# Patient Record
Sex: FEMALE | Race: WHITE | NOT HISPANIC OR LATINO | Employment: OTHER | URBAN - METROPOLITAN AREA
[De-identification: names, ages, dates, MRNs, and addresses within clinical notes are randomized per-mention and may not be internally consistent; named-entity substitution may affect disease eponyms.]

---

## 2017-01-04 ENCOUNTER — HOSPITAL ENCOUNTER (OUTPATIENT)
Facility: HOSPITAL | Age: 82
Discharge: HOSPICE | End: 2017-01-10
Attending: EMERGENCY MEDICINE | Admitting: HOSPITALIST
Payer: MEDICARE

## 2017-01-04 ENCOUNTER — HOSPITAL ENCOUNTER (EMERGENCY)
Facility: HOSPITAL | Age: 82
Discharge: OTHER INSTITUTION | End: 2017-01-04
Attending: EMERGENCY MEDICINE
Payer: MEDICARE

## 2017-01-04 VITALS
SYSTOLIC BLOOD PRESSURE: 174 MMHG | HEART RATE: 88 BPM | RESPIRATION RATE: 18 BRPM | DIASTOLIC BLOOD PRESSURE: 88 MMHG | BODY MASS INDEX: 16.48 KG/M2 | TEMPERATURE: 98 F | OXYGEN SATURATION: 95 % | WEIGHT: 96 LBS

## 2017-01-04 DIAGNOSIS — S32.000A LUMBAR COMPRESSION FRACTURE, CLOSED, INITIAL ENCOUNTER: Primary | ICD-10-CM

## 2017-01-04 DIAGNOSIS — E55.9 VITAMIN D DEFICIENCY: ICD-10-CM

## 2017-01-04 DIAGNOSIS — S32.000G: Primary | ICD-10-CM

## 2017-01-04 DIAGNOSIS — F03.90 DEMENTIA WITHOUT BEHAVIORAL DISTURBANCE, UNSPECIFIED DEMENTIA TYPE: ICD-10-CM

## 2017-01-04 DIAGNOSIS — S32.000A: ICD-10-CM

## 2017-01-04 DIAGNOSIS — Z95.0 CARDIAC PACEMAKER: ICD-10-CM

## 2017-01-04 LAB
ALBUMIN SERPL BCP-MCNC: 3.2 G/DL
ALP SERPL-CCNC: 77 U/L
ALT SERPL W/O P-5'-P-CCNC: 17 U/L
ANION GAP SERPL CALC-SCNC: 10 MMOL/L
AST SERPL-CCNC: 22 U/L
BACTERIA #/AREA URNS HPF: ABNORMAL /HPF
BASOPHILS # BLD AUTO: 0 K/UL
BASOPHILS NFR BLD: 0.1 %
BILIRUB SERPL-MCNC: 1.6 MG/DL
BILIRUB UR QL STRIP: NEGATIVE
BNP SERPL-MCNC: 788 PG/ML
BUN SERPL-MCNC: 34 MG/DL
CALCIUM SERPL-MCNC: 8.6 MG/DL
CHLORIDE SERPL-SCNC: 103 MMOL/L
CLARITY UR: CLEAR
CO2 SERPL-SCNC: 30 MMOL/L
COLOR UR: YELLOW
CREAT SERPL-MCNC: 1 MG/DL
DIFFERENTIAL METHOD: ABNORMAL
EOSINOPHIL # BLD AUTO: 0 K/UL
EOSINOPHIL NFR BLD: 0.3 %
ERYTHROCYTE [DISTWIDTH] IN BLOOD BY AUTOMATED COUNT: 15.8 %
EST. GFR  (AFRICAN AMERICAN): 55 ML/MIN/1.73 M^2
EST. GFR  (NON AFRICAN AMERICAN): 48 ML/MIN/1.73 M^2
GLUCOSE SERPL-MCNC: 107 MG/DL
GLUCOSE UR QL STRIP: NEGATIVE
HCT VFR BLD AUTO: 41.5 %
HGB BLD-MCNC: 13.1 G/DL
HGB UR QL STRIP: ABNORMAL
HYALINE CASTS #/AREA URNS LPF: 6 /LPF
KETONES UR QL STRIP: NEGATIVE
LEUKOCYTE ESTERASE UR QL STRIP: ABNORMAL
LYMPHOCYTES # BLD AUTO: 1.2 K/UL
LYMPHOCYTES NFR BLD: 10.9 %
MCH RBC QN AUTO: 26.8 PG
MCHC RBC AUTO-ENTMCNC: 31.5 %
MCV RBC AUTO: 85 FL
MICROSCOPIC COMMENT: ABNORMAL
MONOCYTES # BLD AUTO: 1.1 K/UL
MONOCYTES NFR BLD: 9.9 %
NEUTROPHILS # BLD AUTO: 8.7 K/UL
NEUTROPHILS NFR BLD: 78.8 %
NITRITE UR QL STRIP: NEGATIVE
PH UR STRIP: 6 [PH] (ref 5–8)
PLATELET # BLD AUTO: 193 K/UL
PMV BLD AUTO: 7.6 FL
POTASSIUM SERPL-SCNC: 3.9 MMOL/L
PROT SERPL-MCNC: 6.7 G/DL
PROT UR QL STRIP: ABNORMAL
RBC # BLD AUTO: 4.88 M/UL
RBC #/AREA URNS HPF: 4 /HPF (ref 0–4)
SODIUM SERPL-SCNC: 143 MMOL/L
SP GR UR STRIP: 1.02 (ref 1–1.03)
SQUAMOUS #/AREA URNS HPF: 3 /HPF
URN SPEC COLLECT METH UR: ABNORMAL
UROBILINOGEN UR STRIP-ACNC: NEGATIVE EU/DL
WBC # BLD AUTO: 11.1 K/UL
WBC #/AREA URNS HPF: 3 /HPF (ref 0–5)

## 2017-01-04 PROCEDURE — 96374 THER/PROPH/DIAG INJ IV PUSH: CPT

## 2017-01-04 PROCEDURE — 99285 EMERGENCY DEPT VISIT HI MDM: CPT | Mod: 25

## 2017-01-04 PROCEDURE — 83880 ASSAY OF NATRIURETIC PEPTIDE: CPT

## 2017-01-04 PROCEDURE — 25000003 PHARM REV CODE 250: Performed by: PHYSICIAN ASSISTANT

## 2017-01-04 PROCEDURE — 99204 OFFICE O/P NEW MOD 45 MIN: CPT | Mod: GC,,, | Performed by: NEUROLOGICAL SURGERY

## 2017-01-04 PROCEDURE — 99285 EMERGENCY DEPT VISIT HI MDM: CPT | Mod: ,,, | Performed by: EMERGENCY MEDICINE

## 2017-01-04 PROCEDURE — 25000003 PHARM REV CODE 250: Performed by: EMERGENCY MEDICINE

## 2017-01-04 PROCEDURE — G0378 HOSPITAL OBSERVATION PER HR: HCPCS

## 2017-01-04 PROCEDURE — 81000 URINALYSIS NONAUTO W/SCOPE: CPT

## 2017-01-04 PROCEDURE — 25000003 PHARM REV CODE 250: Performed by: STUDENT IN AN ORGANIZED HEALTH CARE EDUCATION/TRAINING PROGRAM

## 2017-01-04 PROCEDURE — 85025 COMPLETE CBC W/AUTO DIFF WBC: CPT

## 2017-01-04 PROCEDURE — 36415 COLL VENOUS BLD VENIPUNCTURE: CPT

## 2017-01-04 PROCEDURE — 99219 PR INITIAL OBSERVATION CARE,LEVL II: CPT | Mod: ,,, | Performed by: PHYSICIAN ASSISTANT

## 2017-01-04 PROCEDURE — 99284 EMERGENCY DEPT VISIT MOD MDM: CPT | Mod: 25,27

## 2017-01-04 PROCEDURE — 63600175 PHARM REV CODE 636 W HCPCS: Performed by: EMERGENCY MEDICINE

## 2017-01-04 PROCEDURE — 80053 COMPREHEN METABOLIC PANEL: CPT

## 2017-01-04 RX ORDER — HYDROMORPHONE HYDROCHLORIDE 1 MG/ML
0.5 INJECTION, SOLUTION INTRAMUSCULAR; INTRAVENOUS; SUBCUTANEOUS EVERY 4 HOURS PRN
Status: DISCONTINUED | OUTPATIENT
Start: 2017-01-04 | End: 2017-01-05

## 2017-01-04 RX ORDER — OXYCODONE HYDROCHLORIDE 5 MG/1
5 TABLET ORAL EVERY 4 HOURS PRN
Status: DISCONTINUED | OUTPATIENT
Start: 2017-01-04 | End: 2017-01-07

## 2017-01-04 RX ORDER — ALPRAZOLAM 0.25 MG/1
0.25 TABLET ORAL 2 TIMES DAILY PRN
Status: DISCONTINUED | OUTPATIENT
Start: 2017-01-04 | End: 2017-01-10 | Stop reason: HOSPADM

## 2017-01-04 RX ORDER — ASPIRIN 81 MG/1
81 TABLET ORAL DAILY
Status: DISCONTINUED | OUTPATIENT
Start: 2017-01-05 | End: 2017-01-10 | Stop reason: HOSPADM

## 2017-01-04 RX ORDER — ACETAMINOPHEN 325 MG/1
650 TABLET ORAL EVERY 6 HOURS PRN
Status: DISCONTINUED | OUTPATIENT
Start: 2017-01-04 | End: 2017-01-08

## 2017-01-04 RX ORDER — IBUPROFEN 200 MG
16 TABLET ORAL
Status: DISCONTINUED | OUTPATIENT
Start: 2017-01-05 | End: 2017-01-10 | Stop reason: HOSPADM

## 2017-01-04 RX ORDER — FUROSEMIDE 40 MG/1
40 TABLET ORAL DAILY
Status: DISCONTINUED | OUTPATIENT
Start: 2017-01-05 | End: 2017-01-10 | Stop reason: HOSPADM

## 2017-01-04 RX ORDER — IPRATROPIUM BROMIDE AND ALBUTEROL SULFATE 2.5; .5 MG/3ML; MG/3ML
3 SOLUTION RESPIRATORY (INHALATION) EVERY 4 HOURS PRN
Status: DISCONTINUED | OUTPATIENT
Start: 2017-01-05 | End: 2017-01-10 | Stop reason: HOSPADM

## 2017-01-04 RX ORDER — GLUCAGON 1 MG
1 KIT INJECTION
Status: DISCONTINUED | OUTPATIENT
Start: 2017-01-05 | End: 2017-01-10 | Stop reason: HOSPADM

## 2017-01-04 RX ORDER — POLYETHYLENE GLYCOL 3350 17 G/17G
17 POWDER, FOR SOLUTION ORAL 3 TIMES DAILY PRN
Status: DISCONTINUED | OUTPATIENT
Start: 2017-01-05 | End: 2017-01-10 | Stop reason: HOSPADM

## 2017-01-04 RX ORDER — ONDANSETRON 2 MG/ML
4 INJECTION INTRAMUSCULAR; INTRAVENOUS EVERY 8 HOURS PRN
Status: DISCONTINUED | OUTPATIENT
Start: 2017-01-05 | End: 2017-01-10 | Stop reason: HOSPADM

## 2017-01-04 RX ORDER — PANTOPRAZOLE SODIUM 40 MG/1
40 TABLET, DELAYED RELEASE ORAL DAILY
Status: DISCONTINUED | OUTPATIENT
Start: 2017-01-05 | End: 2017-01-10 | Stop reason: HOSPADM

## 2017-01-04 RX ORDER — RAMELTEON 8 MG/1
8 TABLET ORAL NIGHTLY PRN
Status: DISCONTINUED | OUTPATIENT
Start: 2017-01-04 | End: 2017-01-05

## 2017-01-04 RX ORDER — CETIRIZINE HYDROCHLORIDE 10 MG/1
10 TABLET ORAL DAILY
Status: DISCONTINUED | OUTPATIENT
Start: 2017-01-05 | End: 2017-01-05

## 2017-01-04 RX ORDER — MORPHINE SULFATE 2 MG/ML
2 INJECTION, SOLUTION INTRAMUSCULAR; INTRAVENOUS
Status: COMPLETED | OUTPATIENT
Start: 2017-01-04 | End: 2017-01-04

## 2017-01-04 RX ORDER — IBUPROFEN 200 MG
24 TABLET ORAL
Status: DISCONTINUED | OUTPATIENT
Start: 2017-01-05 | End: 2017-01-10 | Stop reason: HOSPADM

## 2017-01-04 RX ORDER — MIRTAZAPINE 30 MG/1
30 TABLET, FILM COATED ORAL NIGHTLY
Status: DISCONTINUED | OUTPATIENT
Start: 2017-01-04 | End: 2017-01-10 | Stop reason: HOSPADM

## 2017-01-04 RX ORDER — ALPRAZOLAM 0.25 MG/1
0.25 TABLET ORAL
Status: COMPLETED | OUTPATIENT
Start: 2017-01-04 | End: 2017-01-04

## 2017-01-04 RX ORDER — AMOXICILLIN 250 MG
1 CAPSULE ORAL 2 TIMES DAILY
Status: DISCONTINUED | OUTPATIENT
Start: 2017-01-05 | End: 2017-01-10 | Stop reason: HOSPADM

## 2017-01-04 RX ORDER — HYDROMORPHONE HYDROCHLORIDE 1 MG/ML
0.5 INJECTION, SOLUTION INTRAMUSCULAR; INTRAVENOUS; SUBCUTANEOUS
Status: COMPLETED | OUTPATIENT
Start: 2017-01-04 | End: 2017-01-04

## 2017-01-04 RX ORDER — ONDANSETRON 8 MG/1
8 TABLET, ORALLY DISINTEGRATING ORAL EVERY 8 HOURS PRN
Status: DISCONTINUED | OUTPATIENT
Start: 2017-01-05 | End: 2017-01-10 | Stop reason: HOSPADM

## 2017-01-04 RX ORDER — LOPERAMIDE HYDROCHLORIDE 2 MG/1
2 CAPSULE ORAL
Status: DISCONTINUED | OUTPATIENT
Start: 2017-01-05 | End: 2017-01-10 | Stop reason: HOSPADM

## 2017-01-04 RX ORDER — BUSPIRONE HYDROCHLORIDE 5 MG/1
5 TABLET ORAL 2 TIMES DAILY
Status: DISCONTINUED | OUTPATIENT
Start: 2017-01-04 | End: 2017-01-07

## 2017-01-04 RX ORDER — RAMELTEON 8 MG/1
8 TABLET ORAL NIGHTLY PRN
Status: DISCONTINUED | OUTPATIENT
Start: 2017-01-05 | End: 2017-01-10 | Stop reason: HOSPADM

## 2017-01-04 RX ADMIN — BUSPIRONE HYDROCHLORIDE 5 MG: 5 TABLET ORAL at 11:01

## 2017-01-04 RX ADMIN — OXYCODONE HYDROCHLORIDE 5 MG: 5 TABLET ORAL at 11:01

## 2017-01-04 RX ADMIN — ALPRAZOLAM 0.25 MG: 0.25 TABLET ORAL at 03:01

## 2017-01-04 RX ADMIN — HYDROMORPHONE HYDROCHLORIDE 0.5 MG: 1 INJECTION, SOLUTION INTRAMUSCULAR; INTRAVENOUS; SUBCUTANEOUS at 05:01

## 2017-01-04 RX ADMIN — MIRTAZAPINE 30 MG: 30 TABLET, FILM COATED ORAL at 11:01

## 2017-01-04 RX ADMIN — MORPHINE SULFATE 2 MG: 2 INJECTION, SOLUTION INTRAMUSCULAR; INTRAVENOUS at 01:01

## 2017-01-04 NOTE — IP AVS SNAPSHOT
[facility]  [address]  [facility] [address]  Phone: [phone]           Patient Discharge Instructions     Our goal is to set you up for success. This packet includes information on your condition, medications, and your home care. It will help you to care for yourself so you don't get sicker and need to go back to the hospital.     Please ask your nurse if you have any questions.        There are many details to remember when preparing to leave the hospital. Here is what you will need to do:    1. Take your medicine. If you are prescribed medications, review your Medication List in the following pages. You may have new medications to  at the pharmacy and others that you'll need to stop taking. Review the instructions for how and when to take your medications. Talk with your doctor or nurses if you are unsure of what to do.     2. Go to your follow-up appointments. Specific follow-up information is listed in the following pages. Your may be contacted by a transition nurse or clinical provider about future appointments. Be sure we have all of the phone numbers to reach you, if needed. Please contact your provider's office if you are unable to make an appointment.     3. Watch for warning signs. Your doctor or nurse will give you detailed warning signs to watch for and when to call for assistance. These instructions may also include educational information about your condition. If you experience any of warning signs to your health, call your doctor.               [facility]  Unless otherwise directed by your provider, please contact [facility], our nurse care line that is available for 24/7 assistance.     [phone] (toll-free)    Registered nurses in the [facility] provide clinical advisement, health education, appointment booking, and other advisory services.                    ** Verify the list of medication(s) below is accurate and up  to date. Carry this with you in case of emergency. If your medications have changed, please notify your healthcare provider.             Medication List      START taking these medications        Additional Info                      hydrocodone-acetaminophen 5-325mg 5-325 mg per tablet   Commonly known as:  NORCO   Quantity:  90 tablet   Refills:  0   Dose:  1 tablet    Last time this was given:  1 tablet on 1/10/2017  9:40 AM   Instructions:  Take 1 tablet by mouth every 6 (six) hours as needed for Pain.     Begin Date    AM    Noon    PM    Bedtime       naproxen 250 MG tablet   Commonly known as:  NAPROSYN   Refills:  0   Dose:  250 mg    Last time this was given:  250 mg on 1/9/2017  6:40 PM   Instructions:  Take 1 tablet (250 mg total) by mouth 2 (two) times daily as needed (pain).     Begin Date    AM    Noon    PM    Bedtime       quetiapine 25 MG Tab   Commonly known as:  SEROQUEL   Refills:  0   Dose:  25 mg    Last time this was given:  25 mg on 1/10/2017  9:00 AM   Instructions:  Take 1 tablet (25 mg total) by mouth 2 (two) times daily.     Begin Date    AM    Noon    PM    Bedtime         CHANGE how you take these medications        Additional Info                      omeprazole 20 MG capsule   Commonly known as:  PRILOSEC   Quantity:  180 capsule   Refills:  3   Dose:  20 mg   What changed:  when to take this    Instructions:  Take 1 capsule (20 mg total) by mouth once daily.     Begin Date    AM    Noon    PM    Bedtime       temazepam 15 mg Cap   Commonly known as:  RESTORIL   Quantity:  90 capsule   Refills:  0   Dose:  15 mg   What changed:  reasons to take this    Instructions:  Take 1 capsule (15 mg total) by mouth nightly as needed (insomnia).     Begin Date    AM    Noon    PM    Bedtime         CONTINUE taking these medications        Additional Info                      acetaminophen 325 MG tablet   Commonly known as:  TYLENOL   Quantity:  30 tablet   Refills:  0   Dose:  650 mg     Instructions:  Take 2 tablets (650 mg total) by mouth every 4 (four) hours as needed.     Begin Date    AM    Noon    PM    Bedtime       alprazolam 0.25 MG tablet   Commonly known as:  XANAX   Quantity:  60 tablet   Refills:  5   Dose:  0.25 mg    Last time this was given:  0.25 mg on 1/10/2017  9:40 AM   Instructions:  Take 1 tablet (0.25 mg total) by mouth 2 (two) times daily as needed for Anxiety.     Begin Date    AM    Noon    PM    Bedtime       aspirin 81 MG EC tablet   Commonly known as:  ECOTRIN   Refills:  0   Dose:  81 mg    Last time this was given:  81 mg on 1/10/2017  9:00 AM   Instructions:  Take 81 mg by mouth once daily.     Begin Date    AM    Noon    PM    Bedtime       diclofenac sodium 1 % Gel   Commonly known as:  VOLTAREN   Quantity:  100 g   Refills:  6   Dose:  2 g    Instructions:  Apply 2 g topically 4 (four) times daily as needed.     Begin Date    AM    Noon    PM    Bedtime       furosemide 20 MG tablet   Commonly known as:  LASIX   Quantity:  180 tablet   Refills:  3   Dose:  40 mg    Last time this was given:  40 mg on 1/10/2017  9:00 AM   Instructions:  Take 2 tablets (40 mg total) by mouth once daily.     Begin Date    AM    Noon    PM    Bedtime       lidocaine 5 %   Commonly known as:  LIDODERM   Quantity:  30 patch   Refills:  6   Dose:  1 patch    Last time this was given:  1 patch on 1/10/2017  2:06 PM   Instructions:  Place 1 patch onto the skin once daily. Remove & Discard patch within 12 hours or as directed by MD     Begin Date    AM    Noon    PM    Bedtime       loratadine 10 mg tablet   Commonly known as:  CLARITIN   Refills:  0   Dose:  10 mg    Instructions:  Take 10 mg by mouth daily as needed for Allergies.     Begin Date    AM    Noon    PM    Bedtime       mirtazapine 30 MG tablet   Commonly known as:  REMERON   Quantity:  90 tablet   Refills:  3   Dose:  30 mg    Last time this was given:  30 mg on 1/9/2017  8:46 PM   Instructions:  Take 1 tablet (30 mg total) by  mouth every evening.     Begin Date    AM    Noon    PM    Bedtime       ondansetron 4 MG tablet   Commonly known as:  ZOFRAN   Quantity:  30 tablet   Refills:  6   Dose:  4 mg    Instructions:  Take 1 tablet (4 mg total) by mouth every 8 (eight) hours as needed for Nausea.     Begin Date    AM    Noon    PM    Bedtime       PROLIA 60 mg/mL Syrg   Refills:  0   Dose:  60 mg   Generic drug:  denosumab    Instructions:  Inject 60 mg into the skin every 6 (six) months.     Begin Date    AM    Noon    PM    Bedtime       white petrolatum-mineral oil 56.8-42.5% 56.8-42.5 % Oint   Commonly known as:  LACRI-LUBE S.O.P.   Quantity:  7 g   Refills:  11    Instructions:  Place into both eyes every evening.     Begin Date    AM    Noon    PM    Bedtime         STOP taking these medications     busPIRone 5 MG Tab   Commonly known as:  BUSPAR       ciclopirox 8 % Soln   Commonly known as:  PENLAC            Where to Get Your Medications      You can get these medications from any pharmacy     Bring a paper prescription for each of these medications     hydrocodone-acetaminophen 5-325mg 5-325 mg per tablet    temazepam 15 mg Cap         Information about where to get these medications is not yet available     ! Ask your nurse or doctor about these medications     naproxen 250 MG tablet    omeprazole 20 MG capsule    quetiapine 25 MG Tab                  Please bring to all follow up appointments:    1. A copy of your discharge instructions.  2. All medicines you are currently taking in their original bottles.  3. Identification and insurance card.    Please arrive 15 minutes ahead of scheduled appointment time.    Please call 24 hours in advance if you must reschedule your appointment and/or time.        Your Scheduled Appointments     Feb 06, 2017  2:00 PM CST   Established Patient Visit with EBONY Hoover - Podiatry ([place])    [address] [place] [id number] [phone]            Feb 10, 2017  10:00 AM CST   Diagnostic Xray with [facility] [id number] 485 LB LIMIT   [facility] [facility]    [address]  [facility] [id number] [phone]            Feb 10, 2017 11:00 AM CST   Established Patient Visit with MARGO Perdomo Emperat[facility] - Neurosurgery 7th Fl ([place] )    [address]  [facility] [id number] [phone]            Apr 25, 2017  8:00 AM CDT   Non-Fasting Lab with [facility] ([facility])    [address]  [facility] [id number] [phone]            Apr 26, 2017 10:00 AM CDT   Established Patient Visit with Etta Shane MD   [place] - Hematology Oncology ([facility] - [facility])    [address]  [facility] [id number] [phone]              Follow-up Information     Follow up with Elvira Em MD In 2 weeks.    Specialty:  Family Medicine    Contact information:    [address]  [facility] [address]  [phone]          Follow up with Connor Shaw PA-C On 2/10/2017.    Specialty:  Neurosurgery    Why:  Please keep this follow up appointment.     Contact information:    [address]  [facility] [address]  [phone]          Follow up with [facility] [place] [facility] [place].    Specialties:  Nursing Home Agency, SNF Agency    Why:  Nursing Home with Hospice Consult     Contact information:    [address] [facility] [address]  [phone]          Discharge Instructions     Future Orders    X-Ray Lumbar Spine AP And Lateral     Questions:    Does the patient have a neck collar or brace on?:      Reason for Exam:  outpatient follow up L2 fx. in brace upright and supine.    May the Radiologist modify the order per protocol to meet the clinical needs of the patient?:  Yes    Activity as tolerated     Call MD for:  severe uncontrolled pain     Diet general     Questions:    Total calories:      Fat restriction, if any:      Protein restriction, if  "any:      Na restriction, if any:      Fluid restriction:      Additional restrictions:          Primary Diagnosis     Your primary diagnosis was:  Compression Fracture Of Lumbar Vertebra      Admission Information     Date & Time Provider Department CSN    1/4/2017  5:23 PM Jaspreet Estrada MD [facility] [id number]      Care Providers     Provider Role Specialty Primary office phone    Jaspreet Estrada MD Attending Provider Hospitalist [phone]    Reyes Dee MD Team Attending  Hospitalist [phone]    Jaspreet Estrada MD Team Attending  Hospitalist [phone]      Your Vitals Were     BP Pulse Temp Resp Height Weight    125/66 (BP Location: Left arm, Patient Position: Lying, BP Method: Automatic) 73 98.1 °F (36.7 °C) (Axillary) 16 5' 6" (1.676 m) 49.9 kg (110 lb)    SpO2 BMI             92% 17.75 kg/m2         Recent Lab Values        3/13/2015                           5:13 PM           A1C 6.6 (H)                       Allergies as of 1/10/2017        Reactions    Celexa [Citalopram]     Phenergan [Promethazine]     Pt states not allergic to phenergan    Bactrim [Sulfamethoxazole-trimethoprim] Rash      Advance Directives     An advance directive is a document which, in the event you are no longer able to make decisions for yourself, tells your healthcare team what kind of treatment you do or do not want to receive, or who you would like to make those decisions for you.  If you do not currently have an advance directive, [facility] encourages you to create one.  For more information call:  [phone]), [phone] ([phone]),  or log on to [email or web address].        Language Assistance Services     ATTENTION: Language assistance services are available, free of charge. Please call [phone].      ATENCIÓN: Si habla español, tiene a brian disposición servicios gratuitos de asistencia lingüística. Llame al [phone].     CHÚ Ý: N?u b?n nói Ti?ng Vi?t, có các " d?ch v? h? tr? ngôn ng? mi?n phí dành cho b?n. G?i s? [phone].         [facility] complies with applicable Federal civil rights laws and does not discriminate on the basis of race, color, national origin, age, disability, or sex.

## 2017-01-04 NOTE — ED NOTES
Report received from Aashish Vigil RN stable.Call light in reach. Bed in lowest position,  rails up for safety. Pt alert and oriented times 3.Daughter has multiple  Co concerns.

## 2017-01-04 NOTE — IP AVS SNAPSHOT
[address]  [place] [address]  Phone: [phone]           I have received a copy of my After Visit Summary and discharge instructions from [facility].    INSTRUCTIONS RECEIVED AND UNDERSTOOD BY:                     Patient/Patient Representative: ________________________________________________________________     Date/Time: ________________________________________________________________                     Instructions Given By: ________________________________________________________________     Date/Time: ________________________________________________________________
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
## 2017-01-05 LAB
ANION GAP SERPL CALC-SCNC: 8 MMOL/L
BASOPHILS # BLD AUTO: 0.01 K/UL
BASOPHILS NFR BLD: 0.1 %
BUN SERPL-MCNC: 36 MG/DL
CALCIUM SERPL-MCNC: 8.2 MG/DL
CHLORIDE SERPL-SCNC: 106 MMOL/L
CO2 SERPL-SCNC: 30 MMOL/L
CREAT SERPL-MCNC: 1.1 MG/DL
DIFFERENTIAL METHOD: ABNORMAL
EOSINOPHIL # BLD AUTO: 0 K/UL
EOSINOPHIL NFR BLD: 0.1 %
ERYTHROCYTE [DISTWIDTH] IN BLOOD BY AUTOMATED COUNT: 15.9 %
EST. GFR  (AFRICAN AMERICAN): 49 ML/MIN/1.73 M^2
EST. GFR  (NON AFRICAN AMERICAN): 42.5 ML/MIN/1.73 M^2
GLUCOSE SERPL-MCNC: 97 MG/DL
HCT VFR BLD AUTO: 39.1 %
HGB BLD-MCNC: 11.8 G/DL
LYMPHOCYTES # BLD AUTO: 1.4 K/UL
LYMPHOCYTES NFR BLD: 16.5 %
MAGNESIUM SERPL-MCNC: 2.6 MG/DL
MCH RBC QN AUTO: 27 PG
MCHC RBC AUTO-ENTMCNC: 30.2 %
MCV RBC AUTO: 90 FL
MONOCYTES # BLD AUTO: 1.1 K/UL
MONOCYTES NFR BLD: 13.2 %
NEUTROPHILS # BLD AUTO: 6.1 K/UL
NEUTROPHILS NFR BLD: 69.9 %
PHOSPHATE SERPL-MCNC: 3.3 MG/DL
PLATELET # BLD AUTO: 160 K/UL
PMV BLD AUTO: 9.5 FL
POTASSIUM SERPL-SCNC: 5 MMOL/L
RBC # BLD AUTO: 4.37 M/UL
SODIUM SERPL-SCNC: 144 MMOL/L
WBC # BLD AUTO: 8.66 K/UL

## 2017-01-05 PROCEDURE — 97535 SELF CARE MNGMENT TRAINING: CPT

## 2017-01-05 PROCEDURE — G8979 MOBILITY GOAL STATUS: HCPCS | Mod: CK

## 2017-01-05 PROCEDURE — 83735 ASSAY OF MAGNESIUM: CPT

## 2017-01-05 PROCEDURE — G8978 MOBILITY CURRENT STATUS: HCPCS | Mod: CL

## 2017-01-05 PROCEDURE — G8997 SWALLOW GOAL STATUS: HCPCS | Mod: CL

## 2017-01-05 PROCEDURE — 25000003 PHARM REV CODE 250: Performed by: PHYSICIAN ASSISTANT

## 2017-01-05 PROCEDURE — 97161 PT EVAL LOW COMPLEX 20 MIN: CPT

## 2017-01-05 PROCEDURE — 85025 COMPLETE CBC W/AUTO DIFF WBC: CPT

## 2017-01-05 PROCEDURE — 97530 THERAPEUTIC ACTIVITIES: CPT

## 2017-01-05 PROCEDURE — 36415 COLL VENOUS BLD VENIPUNCTURE: CPT

## 2017-01-05 PROCEDURE — G0378 HOSPITAL OBSERVATION PER HR: HCPCS

## 2017-01-05 PROCEDURE — G8996 SWALLOW CURRENT STATUS: HCPCS | Mod: CM

## 2017-01-05 PROCEDURE — 99225 PR SUBSEQUENT OBSERVATION CARE,LEVEL II: CPT | Mod: ,,, | Performed by: PHYSICIAN ASSISTANT

## 2017-01-05 PROCEDURE — 80048 BASIC METABOLIC PNL TOTAL CA: CPT

## 2017-01-05 PROCEDURE — 92610 EVALUATE SWALLOWING FUNCTION: CPT

## 2017-01-05 PROCEDURE — 99214 OFFICE O/P EST MOD 30 MIN: CPT | Mod: ,,, | Performed by: PHYSICIAN ASSISTANT

## 2017-01-05 PROCEDURE — 84100 ASSAY OF PHOSPHORUS: CPT

## 2017-01-05 RX ORDER — OXYCODONE HYDROCHLORIDE 5 MG/1
10 TABLET ORAL EVERY 6 HOURS PRN
Status: DISCONTINUED | OUTPATIENT
Start: 2017-01-05 | End: 2017-01-06

## 2017-01-05 RX ORDER — SODIUM CHLORIDE 9 MG/ML
INJECTION, SOLUTION INTRAVENOUS CONTINUOUS
Status: ACTIVE | OUTPATIENT
Start: 2017-01-05 | End: 2017-01-06

## 2017-01-05 RX ORDER — CETIRIZINE HYDROCHLORIDE 5 MG/1
5 TABLET ORAL DAILY
Status: DISCONTINUED | OUTPATIENT
Start: 2017-01-06 | End: 2017-01-07

## 2017-01-05 RX ADMIN — PANTOPRAZOLE SODIUM 40 MG: 40 TABLET, DELAYED RELEASE ORAL at 08:01

## 2017-01-05 RX ADMIN — OXYCODONE HYDROCHLORIDE 5 MG: 5 TABLET ORAL at 11:01

## 2017-01-05 RX ADMIN — CETIRIZINE HYDROCHLORIDE 10 MG: 10 TABLET, FILM COATED ORAL at 08:01

## 2017-01-05 RX ADMIN — RAMELTEON 8 MG: 8 TABLET, FILM COATED ORAL at 12:01

## 2017-01-05 RX ADMIN — SODIUM CHLORIDE: 0.9 INJECTION, SOLUTION INTRAVENOUS at 04:01

## 2017-01-05 RX ADMIN — STANDARDIZED SENNA CONCENTRATE AND DOCUSATE SODIUM 1 TABLET: 8.6; 5 TABLET, FILM COATED ORAL at 12:01

## 2017-01-05 RX ADMIN — STANDARDIZED SENNA CONCENTRATE AND DOCUSATE SODIUM 1 TABLET: 8.6; 5 TABLET, FILM COATED ORAL at 08:01

## 2017-01-05 RX ADMIN — ALPRAZOLAM 0.25 MG: 0.25 TABLET ORAL at 06:01

## 2017-01-05 RX ADMIN — BUSPIRONE HYDROCHLORIDE 5 MG: 5 TABLET ORAL at 08:01

## 2017-01-05 RX ADMIN — FUROSEMIDE 40 MG: 40 TABLET ORAL at 08:01

## 2017-01-05 RX ADMIN — HYDROMORPHONE HYDROCHLORIDE 0.5 MG: 1 INJECTION, SOLUTION INTRAMUSCULAR; INTRAVENOUS; SUBCUTANEOUS at 08:01

## 2017-01-05 RX ADMIN — OXYCODONE HYDROCHLORIDE 10 MG: 5 TABLET ORAL at 04:01

## 2017-01-05 RX ADMIN — OXYCODONE HYDROCHLORIDE 5 MG: 5 TABLET ORAL at 05:01

## 2017-01-05 RX ADMIN — ASPIRIN 81 MG: 81 TABLET, COATED ORAL at 08:01

## 2017-01-05 RX ADMIN — MIRTAZAPINE 30 MG: 30 TABLET, FILM COATED ORAL at 08:01

## 2017-01-05 RX ADMIN — ALPRAZOLAM 0.25 MG: 0.25 TABLET ORAL at 04:01

## 2017-01-05 NOTE — SUBJECTIVE & OBJECTIVE
Interval History: Patient with recurrence of lower back pain, but family reports increased confusion after IV medication administration. Family extremely concerned about patient returning home as they report she does not have good care at home and they will be unable to fully care for her. Patient reporting pain on exam this morning, has perseverative thoughts/statements, confusion of object names. Daughter reports this is her baseline dementia but pain keeping her from relaxing this morning.    Review of Systems   Unable to perform ROS: Dementia     Objective:     Vital Signs (Most Recent):  Temp: 98.1 °F (36.7 °C) (01/05/17 1200)  Pulse: 69 (01/05/17 1200)  Resp: 18 (01/05/17 1200)  BP: 123/64 (01/05/17 1200)  SpO2: 97 % (01/05/17 1200) Vital Signs (24h Range):  Temp:  [96.9 °F (36.1 °C)-98.5 °F (36.9 °C)] 98.1 °F (36.7 °C)  Pulse:  [63-89] 69  Resp:  [18] 18  BP: (104-174)/(55-88) 123/64     Weight: 49.9 kg (110 lb)  Body mass index is 17.75 kg/(m^2).  No intake or output data in the 24 hours ending 01/05/17 1441   Physical Exam   Constitutional: She is oriented to person, place, and time. She appears well-developed and well-nourished. No distress.   HENT:   Head: Normocephalic and atraumatic.   Neck: Carotid bruit is not present.   Cardiovascular: Normal rate and regular rhythm.  Exam reveals no gallop.    No murmur heard.  Pulmonary/Chest: Effort normal and breath sounds normal. No respiratory distress. She has no wheezes.   Abdominal: Soft. Bowel sounds are normal. She exhibits no distension. There is no splenomegaly or hepatomegaly. There is no tenderness.   Musculoskeletal: Normal range of motion. She exhibits tenderness (Lumbar spine). She exhibits no edema or deformity.   Neurological: She is alert and oriented to person, place, and time. No cranial nerve deficit or sensory deficit.       Significant Labs: All pertinent labs within the past 24 hours have been reviewed.    Significant Imaging: I have  reviewed all pertinent imaging results/findings within the past 24 hours.

## 2017-01-05 NOTE — NURSING
Patient has severe dementia, unable to reorient. Bed alarm and avays camera in place for safety. LSO brace will in bed at 90 deg, patient prefers to sleep sitting up.      Patient perseverates but sleeps well with pain meds. Pills need to be crushed in pudding, will pocket whole pills. Not combative, but will try to undress and mess with medical devices.      Will continue to monitor. Waiting for SW to find placement.

## 2017-01-05 NOTE — ASSESSMENT & PLAN NOTE
- Appreciate neurosurgery's assistance.  Family does not wish to pursue surgical options.  Patient is nonambulatory at baseline.  LSO brace.  - Will order sitting and supine x-rays in brace.  - Supportive care.

## 2017-01-05 NOTE — ED NOTES
LOC: The patient is awake, alert, and oriented to place, time, situation though she is hard of hearing. Affect is appropriate.  Speech is appropriate and clear.     APPEARANCE: Patient resting comfortably in no acute distress.  Patient is clean and well groomed.    SKIN: The skin is warm and dry; color consistent with ethnicity.  Patient has normal skin turgor and moist mucus membranes.  Skin intact; bruising noted to the bilateral lower extremities.     MUSCULOSKELETAL: Patient moving upper and lower extremities upon command but complains of pain in the lower back with movement.  Reports weakness.     RESPIRATORY: Airway is open and patent. Respirations spontaneous, even, easy, and non-labored.  Patient has a normal effort and rate.  No accessory muscle use noted. Denies cough.     CARDIAC:  Normal rhythm and rate noted.  +3 non-pitting peripheral edema noted. No complaints of chest pain.      ABDOMEN: Soft and non tender to palpation.  No distention noted.     NEUROLOGIC: Eyes open spontaneously.  Behavior appropriate to situation.  Follows commands; facial expression symmetrical.  Purposeful motor response noted; normal sensation in all extremities.

## 2017-01-05 NOTE — PROGRESS NOTES
[facility] Hospital Medicine  Progress Note    Patient Name: Melina Boss  MRN: [id number]  Patient Class: OP- Observation   Admission Date: 1/4/2017  Length of Stay: 0 days  Expected Discharge Date: 1/6/2017  Attending Physician: Jaspreet Estrada MD  Primary Care Provider: Elvira Em MD    [facility] Medicine Team: [facility] HOSP MED E Nohemi Pedersen PA-C    Subjective:     Principal Problem:Compression of lumbar vertebra    HPI:  Patient is a 89 year old lady with a h/o osteoarthritis, dementia, bradycardia s/p PPM, GERD, OA, and vitamin D deficiency.  She was transferred from [facility] for neurosurgical evaluation of L2 compression fracture.  Daughter at bedside provides most of history.  She reports a sudden change in patient's posture on 1/1/2017 when patient was shifted from her car to her wheelchair by her caregiver.  Since that time, she has complained of LBP.  Today, however, she was unable to perform her ADLs.  CT spine revealed L2 compression fracture with extension into the left pedicle.  She continues to have back pain, but states it is better with pain meds.  She was in her usual state of health prior to this event.  Denies loss of bowel/bladder control.  Denies chest pain, SOB, N/V, fever/chills.  Patient is very hard of hearing and is able to hear best out of L ear.  She uses a wheelchair at baseline.    Hospital Course:  89 yo female admitted to observation for further evaluation and treatment of compression of lumbar vertebra. Neurosurgery consulted, family does not wish to pursue surgical options at this time, LSO brace, repeat XR in brace (supine and sitting) pending. Continue supportive care and pain control. PT/OT consulted, recommending [facility] SNF. CM/SW assisting in placement.    Interval History: Patient with recurrence of lower back pain, but family reports increased confusion after IV medication administration. Family extremely concerned about patient returning home as they  report she does not have good care at home and they will be unable to fully care for her. Patient reporting pain on exam this morning, has perseverative thoughts/statements, confusion of object names. Daughter reports this is her baseline dementia but pain keeping her from relaxing this morning.    Review of Systems   Unable to perform ROS: Dementia     Objective:     Vital Signs (Most Recent):  Temp: 98.1 °F (36.7 °C) (01/05/17 1200)  Pulse: 69 (01/05/17 1200)  Resp: 18 (01/05/17 1200)  BP: 123/64 (01/05/17 1200)  SpO2: 97 % (01/05/17 1200) Vital Signs (24h Range):  Temp:  [96.9 °F (36.1 °C)-98.5 °F (36.9 °C)] 98.1 °F (36.7 °C)  Pulse:  [63-89] 69  Resp:  [18] 18  BP: (104-174)/(55-88) 123/64     Weight: 49.9 kg (110 lb)  Body mass index is 17.75 kg/(m^2).  No intake or output data in the 24 hours ending 01/05/17 1441   Physical Exam   Constitutional: She is oriented to person, place, and time. She appears well-developed and well-nourished. No distress.   HENT:   Head: Normocephalic and atraumatic.   Neck: Carotid bruit is not present.   Cardiovascular: Normal rate and regular rhythm.  Exam reveals no gallop.    No murmur heard.  Pulmonary/Chest: Effort normal and breath sounds normal. No respiratory distress. She has no wheezes.   Abdominal: Soft. Bowel sounds are normal. She exhibits no distension. There is no splenomegaly or hepatomegaly. There is no tenderness.   Musculoskeletal: Normal range of motion. She exhibits tenderness (Lumbar spine). She exhibits no edema or deformity.   Neurological: She is alert and oriented to person, place, and time. No cranial nerve deficit or sensory deficit.       Significant Labs: All pertinent labs within the past 24 hours have been reviewed.    Significant Imaging: I have reviewed all pertinent imaging results/findings within the past 24 hours.    Assessment/Plan:      * Compression of lumbar vertebra  - Appreciate neurosurgery's assistance.  Family does not wish to pursue  surgical options.  Patient is nonambulatory at baseline.  LSO brace.  - Sitting and supine x-rays in brace with L2 compression fracture with no change in alignment of the lumbar spine on the sitting and upright views  - Supportive care. Discontinued IV pain medication, will try to manage with oral analgesics PRN as patient with increased confusion today.   - PT/OT consults recommending [facility] SNF. CM/SW assisting with possible placement. Family reports they are unable to care for patient at home.       Vitamin D deficiency  - Vitamin D level pending      Cardiac pacemaker  - Placed secondary to bradycardia.  Stable.      Dementia  - Continue Remeron 30mg qHS, Xanax 0.25mg BID PRN.  - Daughter notes patient tends to sundown.    DELIRIUM BEHAVIOR MANAGEMENT  - Minimize use of restraints; if physical restraints necessary, please utilize medical/chemical prns for agitation.  - Keep shades open and room lit during day and room dim at night in order to promote healthy circadian rhythms.  - Encourage family at bedside  - Keep whiteboard in patient's room current with the date and name of the members of patient's team for easy patient self re-orientation.  - Avoid benzodiazepines, antihistamines, anticholinergics, hypnotics, and minimize opiates while controlling for pain as these medications may exacerbate delirium.      VTE Risk Mitigation         Ordered     Medium Risk of VTE  Once      01/04/17 2357     Place SAUL hose  Until discontinued      01/04/17 2357     Place sequential compression device  Until discontinued      01/04/17 2357          Nohemi Pedersen PA-C  Department of Hospital Medicine   [facility]  Staff: Dr. Estrada

## 2017-01-05 NOTE — PROGRESS NOTES
Entered pt's room to transfer pt to [facility] & pt's family was attempting to get pt out of bed. Educated family members on importance of therapy evaluating pt & pt's mobility status before ambulating pt and on the importance of calling for assistance 2/2 pt being a fall risk. The family had also taken NC off of pt. Educated family on the importance of pt wearing NC to ensure adequate oxygenation. After thorough education & pt's family verbalizing understanding, pt was transferred to 1064 in stable condition. Report given to RN.

## 2017-01-05 NOTE — CONSULTS
Consult Note  Neurosurgery    Consult Requested By: ED  Reason for Consult: L2 fracture    SUBJECTIVE:     History of Present Illness:  Patient is a 89 y.o. female with PMHX of dementia, osteoarthritis, CHF, cardiomyopathy, pacemaker,  recently on eliquis for hx of LE DVT presents for evaluation of L2 fracture.  Per daughter pt developed back pain after attempting to sit in her wheelchair.  Caregiver was assisting pt into wheelchair where she plopped down and hit her tailbone onto edge of wheelchair.  She developed nonradicular back pain following which has progressively gotten worse.  Pt is still able to sense the urge to void and family claims has not been incontinent since the event.  Pt is however not ambulatory at baseline.    Scheduled Meds:  Continuous Infusions:  PRN Meds:    Review of patient's allergies indicates:   Allergen Reactions    Celexa [citalopram]     Phenergan [promethazine]      Pt states not allergic to phenergan      Bactrim [sulfamethoxazole-trimethoprim] Rash       Past Medical History   Diagnosis Date    Anxiety associated with depression     Arthritis     Encounter for blood transfusion     Fracture of femoral neck, right, closed 4/30/2013    Hypertension     Nausea     Osteoarthritis     Urinary tract infection      Past Surgical History   Procedure Laterality Date    Knee surgery       right TKA    Varicose veins       venous stripping    Cardiac pacemaker placement      Total hip arthroplasty Bilateral     Blepheroplasty      Ear tuck      Deviated septum repair       Family History   Problem Relation Age of Onset    Hypertension Daughter     Stroke Other     Cancer Other      melanoma    Kidney disease Neg Hx      Social History   Substance Use Topics    Smoking status: Never Smoker    Smokeless tobacco: Never Used    Alcohol use No        Review of Systems:  See HPI    OBJECTIVE:     Vital Signs (Most Recent)  Temp: 98.5 °F (36.9 °C) (01/04/17 1705)  Pulse:  89 (01/04/17 1705)  Resp: 18 (01/04/17 1705)  BP: 123/60 (01/04/17 1806)  SpO2: 99 % (01/04/17 1807)    Vital Signs Range (Last 24H):  Temp:  [98.2 °F (36.8 °C)-98.5 °F (36.9 °C)]   Pulse:  [69-89]   Resp:  [18-20]   BP: (123-174)/(60-88)   SpO2:  [92 %-99 %]     Physical Exam:  Awake, oriented to name only  PERRL, EOMI, face grossly symmetric, tongue midline  4/5 in BUE/BLE  SILT  No babinski  No spinal tenderness to palpation    Laboratory:  CBC:   Recent Labs  Lab 01/04/17  1036   WBC 11.10   RBC 4.88   HGB 13.1   HCT 41.5      MCV 85   MCH 26.8*   MCHC 31.5*     CMP:   Recent Labs  Lab 01/04/17  1036      CALCIUM 8.6*   ALBUMIN 3.2*   PROT 6.7      K 3.9   CO2 30*      BUN 34*   CREATININE 1.0   ALKPHOS 77   ALT 17   AST 22   BILITOT 1.6*     Coagulation: No results for input(s): INR, APTT in the last 168 hours.    Invalid input(s): PT  Cardiac markers: No results for input(s): CKMB, CPKMB, TROPONINT, TROPONINI, MYOGLOBIN in the last 168 hours.    Recent Labs  Lab 01/04/17  1336   COLORU Yellow   SPECGRAV 1.020   PHUR 6.0   PROTEINUA Trace*   BACTERIA Moderate*   NITRITE Negative   LEUKOCYTESUR Trace*   UROBILINOGEN Negative   HYALINECASTS 6*         Diagnostic Results:  CT L spine: burst fracture involving superior endplate of L2 with less than 50% loss of height.  Small amount of retropulsion into spinal canal with mild canal stenosis.    ASSESSMENT/PLAN:     89 F s/p fall with L2 compression fracture.  -No acute NSGY intervention  -Family doesn't wish to pursue surgical options  -LSO brace  -Sitting and supine xrays in brace  -Admit to medicine for pain control  -Discussed with Dr. Chester.

## 2017-01-05 NOTE — PROGRESS NOTES
Progress Note  Neurosurgery    Admit Date: 1/4/2017  Post-operative Day:    Hospital Day: 2    SUBJECTIVE:     Melina Boss is a 89 y.o. female  with PMHX of dementia, osteoarthritis, CHF, cardiomyopathy, pacemaker, recently on eliquis for hx of LE DVT, now with L2 fracture. NAEON. Patient laying in bed complains of low back pain. She denies in LE pain/numbness/paresthesias or b/b incontinence. Brace at bedside. Daughter at bedside.       Follow-up For:  * No surgery found *      Scheduled Meds:   aspirin  81 mg Oral Daily    busPIRone  5 mg Oral BID    [START ON 1/6/2017] cetirizine  5 mg Oral Daily    furosemide  40 mg Oral Daily    mirtazapine  30 mg Oral QHS    pantoprazole  40 mg Oral Daily    senna-docusate 8.6-50 mg  1 tablet Oral BID     Continuous Infusions:   PRN Meds:acetaminophen, albuterol-ipratropium 2.5mg-0.5mg/3mL, alprazolam, dextrose 50%, dextrose 50%, glucagon (human recombinant), glucose, glucose, HYDROmorphone, loperamide, ondansetron, ondansetron, oxycodone, polyethylene glycol, ramelteon    Review of patient's allergies indicates:   Allergen Reactions    Celexa [citalopram]     Phenergan [promethazine]      Pt states not allergic to phenergan      Bactrim [sulfamethoxazole-trimethoprim] Rash       OBJECTIVE:     Vital Signs (Most Recent)  Temp: 98.1 °F (36.7 °C) (01/05/17 1200)  Pulse: 69 (01/05/17 1200)  Resp: 18 (01/05/17 1200)  BP: 123/64 (01/05/17 1200)  SpO2: 97 % (01/05/17 1200)    Vital Signs Range (Last 24H):  Temp:  [96.9 °F (36.1 °C)-98.5 °F (36.9 °C)]   Pulse:  [63-89]   Resp:  [18]   BP: (104-174)/(55-88)   SpO2:  [95 %-100 %]     I & O (Last 24H):No intake or output data in the 24 hours ending 01/05/17 1100  Physical Exam:  General: well developed, well nourished. no acute distress.  Neurologic: Awake, alert and oriented x3. Thought content appropriate.  Head: normocephalic, atraumatic   GCS: Motor: 6/Verbal: 5/Eyes: 4 GCS Total: 15  Cranial nerves: face symmetric,  tongue midline, pupils equal, round, reactive to light with accomodation, extraocular muscles intact. CN II-XII grossly intact.   Language: no aphasia  Speech: no dysarthria   Sensory: response to light touch throughout  Motor Strength: Moves all extremities spontaneously with good tone. 4+/5 throughout. Generalized deconditioning.   Barrett: absent  Clonus: absent  Babinski: absent   Gait: did not assess.   ENT: normal hearing with finger rub  Heart: RRR, no cyanosis, pallor, or edema.   Lungs:  normal respiratory effort  Abdomen: soft, non-tender and symmetric  Extremities: warm with no cyanosis, edema, or clubbing  Pulses: palpable distal pulses  Skin: warm, dry and intact. No visible rashes or lesions.       Lines/Drains:       Peripheral IV - Single Lumen 01/04/17 1040 Left Upper Arm (Active)   Site Assessment Clean;Dry;Intact 1/4/2017 10:30 PM   Line Status Flushed 1/4/2017 10:30 PM   Dressing Status Clean;Dry;Intact 1/4/2017 10:30 PM   Dressing Change Due 01/08/17 1/4/2017 10:30 PM   Site Change Due 01/08/17 1/4/2017 10:30 PM   Reason Not Rotated Not due 1/4/2017 10:30 PM   Number of days:1       Laboratory:  CBC:   Recent Labs  Lab 01/05/17  0351   WBC 8.66   RBC 4.37   HGB 11.8*   HCT 39.1      MCV 90   MCH 27.0   MCHC 30.2*     BMP:   Recent Labs  Lab 01/05/17  0351   GLU 97      K 5.0      CO2 30*   BUN 36*   CREATININE 1.1   CALCIUM 8.2*   MG 2.6       Diagnostic Results:  Sitting and supine Lumbar xrays: personally reviewed and agree with findings  Burst fracture at L2     ASSESSMENT/PLAN:     89 F s/p fall with L2 compression fracture.    -Patient neurostable on exam.  -No acute neurosurigcal intervention  -Pain management per primary team.   -Family doesn't wish to pursue surgical options  -LSO brace when OOB or upright.   -Sitting and supine xrays in brace reviewed with Dr. Chester  -Signing off. Will set up 8 wk NSR clinic follow up with xrays prior.     Discussed with   Henrik.    Please call with any questions.    Connor Shaw PA-C  Neurosurgery  Pager: [phone]

## 2017-01-05 NOTE — PLAN OF CARE
2:00pm: SW met with Pt and family at bedside. Discussed differences between SNF and regular placement. They reported having a facility they were going to place her but she had a decline in health and ended up here. They also had hospice set up there. Discussed getting her a list for local SNF but reported SW would confer with CM and therapy for recommendation first.     3:30pm: SW spoke with CM who reported the plan would stay the same for the Pt to go to the previous facility.    4:10 PM: Contacted [facility] ([phone]) to speak with Gabrielle regarding placement tomorrow. Also about Hospice through [facility] in [place]. She reported they will take Pt in the morning. SW will fax the requested paperwork to [phone].    Sania Patterson LMSW  PRN   [facility]

## 2017-01-05 NOTE — PT/OT/SLP EVAL
"Speech Language Pathology  Clinical Swallow Evaluation    Melina Boss   MRN: [id number]   Admitting Diagnosis: Compression of lumbar vertebra    Diet recommendations: Solid Diet Level: NPO  Liquid Diet Level: NPO except for meds (buried whole in puree; may crush and bury is difficulty clearing whole); ice chips sparingly for pleasure  MBSS Friday 1/6     SLP Treatment Date: 01/05/17  Speech Start Time: 1455     Speech Stop Time: 1532     Speech Total (min): 37 min       TREATMENT BILLABLE MINUTES:  Eval Swallow and Oral Function 22 and [facility] Care/Home Management Training 15    Diagnosis: Compression of lumbar vertebra  H/o dysphagia (MBSS in 2013 revealed silent aspiration of thin liquids; daughter states pt refused to drink thickened liquids so was not compliant, but never developed aspiration pneumonia)  Premorbid dementia    Past Medical History   Diagnosis Date    Anxiety associated with depression     Arthritis     Encounter for blood transfusion     Fracture of femoral neck, right, closed 4/30/2013    Hypertension     Nausea     Osteoarthritis     Urinary tract infection      Past Surgical History   Procedure Laterality Date    Knee surgery       right TKA    Varicose veins       venous stripping    Cardiac pacemaker placement      Total hip arthroplasty Bilateral     Blepheroplasty      Ear tuck      Deviated septum repair         Has the patient been evaluated by SLP for swallowing? : Yes  Keep patient NPO?: Yes   General Precautions: Standard, aspiration, fall, hearing impaired          Prior diet: regular/thin      Subjective:  "That's enough." pt needing encouragement to continue to accept PO trials for bedside swallow assessment.    "But she's going to get dehydrated." pt's daughter concerned about recommendations for pt to remain NPO until MBSS tomorrow. Concerns were reported to PA and nurse.  Plan to begin IV fluids.    Pain Rating:  (did not rate)        Location:  (feet)  Pain " Addressed: Nurse notified       Objective:        Oral Musculature Evaluation  Oral Musculature: general weakness  Dentition: scattered dentition  Secretion Management: problems swallowing secretions  Mucosal Quality: adequate  Mandibular Strength and Mobility: WFL  Oral Labial Strength and Mobility: WFL  Lingual Strength and Mobility: impaired strength  Buccal Strength and Mobility: WFL  Volitional Cough: fair volitional with encouragment; weak reflexive  Volitional Swallow: did not follow commands to swallow  Voice Prior to PO Intake: wet/gurgly     Bedside Swallow Eval: Daughter feeding pt cup of pudding upon SLP's entry. Stated she had already eaten another cup of pudding and part of a cookie prior to SLP's entry. Pt noted to have wet /gurgly vocal quality upon entry.  Consistencies Assessed: Thin liquids tsp x 3, cup sip x 3, Nectar thick liquids tsp x 1 and cup sip x 2, Puree 1/2 tsp pudding x 1 and Solids tiny portion of chocolate chip cookie   Oral Phase: slow oral transit time; generalized residue  Pharyngeal Phase: coughing/choking, throat clearing and wet vocal quality after swallow    Additional Treatment:  Extensive education provided to daughter regarding overt s/s of aspiration, silent aspiration, risks of aspiration, results of MBSS from 2013, recommendations to repeat MBSS tomorrow before initiating PO diet, and possible outcomes and recommendations following MBSS. SLP explained recommendations for pt to remain NPO except to receive PO meds buried in puree.  Pt's daughter expressed concern with pt maintaining hydration and receiving nutrition, since she has had little PO since 6am 2 days ago.  SLP explained pt would not be at risk for malnourishment if remains NPO until tomorrow when MBSS is performed, but would alert nurse and PA of hydration concerns.  PA and nurse informed and stated plans to begin IV fluids.  Further recommendations for PO diet to be made following MBSS  tomorrow.      Assessment:  Melina Boss is a 89 y.o. female with a medical diagnosis of Compression of lumbar vertebra and presents with dysphagia.          Discharge recommendations: Discharge Facility/Level Of Care Needs: nursing facility, skilled     Goals:   SLP Goals        Problem: SLP Goal    Goal Priority Disciplines Outcome   SLP Goal     SLP    Description:  Speech Language Pathology Goals  Goals expected to be met by 1/6:  1. Pt will participate in Modified Barium Swallow Study to determine safest PO diet.                     Plan:   Patient to be seen Therapy Frequency: 5 x/week   Plan of Care expires: 02/03/17  Plan of Care reviewed with: patient, daughter            SLP G-Codes  Functional Assessment Tool Used: noms  Score: 2  Functional Limitations: Swallowing  Swallow Current Status (): CM  Swallow Goal Status (): CL    NAVIN Simon, CCC-SLP  01/05/2017      NAVIN Simon, CCC-SLP  Speech Language Pathologist  [phone]  1/5/2017

## 2017-01-05 NOTE — ASSESSMENT & PLAN NOTE
- Appreciate neurosurgery's assistance.  Family does not wish to pursue surgical options.  Patient is nonambulatory at baseline.  LSO brace.  - Sitting and supine x-rays in brace with L2 compression fracture with no change in alignment of the lumbar spine on the sitting and upright views  - Supportive care. Discontinued IV pain medication, will try to manage with oral analgesics PRN as patient with increased confusion today.   - PT/OT consults recommending NH SNF. CM/SW assisting with possible placement. Family reports they are unable to care for patient at home.

## 2017-01-05 NOTE — ED NOTES
Pt resting quietly on stretcher; daughter assisting pt with eating. Pt and family updated on plan for XR; will continue to monitor pt.

## 2017-01-05 NOTE — PT/OT/SLP EVAL
Physical Therapy  Evaluation/Treatment    Melina Boss   MRN: [id number]   Admitting Diagnosis: Compression of lumbar vertebra    PT Received On: 01/05/17  PT Start Time: 1015     PT Stop Time: 1038    PT Total Time (min): 23 min       Billable Minutes:  Evaluation 15 and Therapeutic Activity 8    Diagnosis: Compression of lumbar vertebra   L2 stable compression fracture    Past Medical History   Diagnosis Date    Anxiety associated with depression     Arthritis     Encounter for blood transfusion     Fracture of femoral neck, right, closed 4/30/2013    Hypertension     Nausea     Osteoarthritis     Urinary tract infection       Past Surgical History   Procedure Laterality Date    Knee surgery       right TKA    Varicose veins       venous stripping    Cardiac pacemaker placement      Total hip arthroplasty Bilateral     Blepheroplasty      Ear tuck      Deviated septum repair         Referring physician: Jaspreet Estrada  Date referred to PT: 1/5/2017    General Precautions: Standard, fall, hearing impaired  Orthopedic Precautions: N/A   Braces: LSO            Patient History:  Transportation Available: family or friend will provide  Living Environment Comment: Per pt dgt, pt living at home with 24/7 care initially; however due to inability to secure 24/7 care pt was being admitted to memory facility where pt must be fully functioning. Pt wheelchair bound for ~1 year but able to perform stand pivot transfers (I).    Equipment Currently Used at Home: wheelchair  DME owned (not currently used): rolling walker    Previous Level of Function:  Ambulation Skills: unable to perform  Transfer Skills: independent  ADL Skills: independent  Work/Leisure Activity: independent    Subjective:  Communicated with nsg prior to session.    Chief Complaint: pain in back and lower legs  Patient goals: to improve mobility; not go to a nursing home    Pain Rating:  (did not rate)   Location - Side: Bilateral  Location -  "Orientation: lower  Location: back  Pain Addressed: Reposition, Distraction  Pain Rating Post-Intervention:  (did not rate; no incr in pain observed)      Pt req encouragement for participation in session. " I can't do this." per pt multiple times. Dgt provided incr encouragement.    Objective:   Patient found with: peripheral IV, telemetry; found supine in bed c/ dgt at bedside     Cognitive Exam:  Oriented to: Person, Place, Time and Situation    Follows Commands/attention: Follows two-step commands  Communication: clear/fluent  Safety awareness/insight to disability: impaired    Physical Exam:  Postural examination/scapula alignment: Rounded shoulder, Head forward and Kyphosis    Skin integrity: Thin and Dry  Edema: Mild in (B) feet    Sensation:   Intact    Lower Extremity Range of Motion:  Right Lower Extremity: WFL  Left Lower Extremity: WFL    Lower Extremity Strength:  Right Lower Extremity: based on functional mobility; at least 4-/5 hip flexion, 4/5 knee ext/flex  Left Lower Extremity: based on functional mobility; at least 4-/5 hip flexion, 4/5 knee ext/flex     Fine motor coordination:  Impaired  RLE heel shin max and LLE heel shin max    Gross motor coordination: WFL    Functional Mobility:  Bed Mobility:  Scooting/Bridging: Maximum Assistance  Supine to Sit: Maximum Assistance  Sit to Supine: Maximum Assistance    Transfers:  Sit <> Stand Assistance: Maximum Assistance (x1 trial at bedside; incr kyphotic posture denoted)  Sit <> Stand Assistive Device: No Assistive Device    Gait:   Gait Distance: unable to perform due to incr pain and discomfort    Balance:   Static Sit: GOOD-: Takes MODERATE challenges from all directions but inconsistently  Dynamic Sit: GOOD-: Maintains balance through MODERATE excursions of active trunk movement,     Static Stand: FAIR+: Takes MINIMAL challenges from all directions  Dynamic stand: FAIR+: Needs CLOSE SUPERVISION during gait and is able to right self with minor " LOB    Therapeutic Activities and Exercises:  Educated pt and pt dgt on overall POC and role of PT. Provided extensive education regarding current needs of therapy and potential for advancement. Dgt expressed pt being unable to return home in current condition. Pt also reflective of difficulty of placing mother in further placement due to current age. Open to short term placement.     Educated pt on logrolling technique and maintaining spinal precautions (no bending, lifting, or twisting).  Educated on LSO usage with OOB mobility.       Pt sat EOB for 8 minutes with (B) UE support; CGA and minimal balance impairment denoted.  Pt stood for ~20 secs at bedside with MAX A  -incr kyphotic posture and hip flexion in standing position req tactile cues to remain in upright position.     AM-PAC 6 CLICK MOBILITY  How much help from another person does this patient currently need?   1 = Unable, Total/Dependent Assistance  2 = A lot, Maximum/Moderate Assistance  3 = A little, Minimum/Contact Guard/Supervision  4 = None, Modified [place]/Independent    Turning over in bed (including adjusting bedclothes, sheets and blankets)?: 2  Sitting down on and standing up from a chair with arms (e.g., wheelchair, bedside commode, etc.): 2  Moving from lying on back to sitting on the side of the bed?: 2  Moving to and from a bed to a chair (including a wheelchair)?: 2  Need to walk in hospital room?: 1  Climbing 3-5 steps with a railing?: 1  Total Score: 10     AM-PAC Raw Score CMS G-Code Modifier Level of Impairment Assistance   6 % Total / Unable   7 - 9 CM 80 - 100% Maximal Assist   10 - 14 CL 60 - 80% Moderate Assist   15 - 19 CK 40 - 60% Moderate Assist   20 - 22 CJ 20 - 40% Minimal Assist   23 CI 1-20% SBA / CGA   24 CH 0% Independent/ Mod I     Patient left supine with all lines intact, call button in reach and mother present.    Assessment:   Melina Boss is a 89 y.o. female with a medical diagnosis of Compression of  lumbar vertebra and presents with incr pain limiting overall participation with evaluation and performance of mobility. Per dgt, pt wheelchair bound over past year but able to assist c/ transfers and minimal gait; previously able to provide 24/7 care however pt was transitioning to assisted living type facility.  During evaluation, pt req max cueing and encouragement for all aspects of mobility; incr cues req also due to pt with severe [place] status. Pt unsafe to return home without 24/7 assistance. Recommended for discharge to short stay SNF to improve overall mobility to assist c/ returning to PLOF. Due to pt elderly age and poor pain tolerance, pt may need further placement if unable to improve current pain management with therapy.     Rehab identified problem list/impairments: Rehab identified problem list/impairments: weakness, impaired endurance, impaired functional mobilty, gait instability, decreased lower extremity function, decreased safety awareness, pain, impaired balance    Rehab potential is fair.    Activity tolerance: Fair    Discharge recommendations: Discharge Facility/Level Of Care Needs: nursing facility, skilled ( SNF to improve transfers with wheelchair mobility)     Barriers to discharge: Barriers to Discharge: Decreased caregiver support    Equipment recommendations: Equipment Needed After Discharge:  (TBD pending progress)     GOALS:   Physical Therapy Goals        Problem: Physical Therapy Goal    Goal Priority Disciplines Outcome Goal Variances Interventions   Physical Therapy Goal     PT/OT, PT Ongoing (interventions implemented as appropriate)     Description:  Goals to be met by: 7 days ()    Patient will increase functional independence with mobility by performin. Supine to sit with MInimal Assistance  2. Sit to supine with MInimal Assistance  3. Sit to stand transfer with Minimal Assistance  4. Bed to chair transfer with Moderate Assistance using Rolling Walker  5. Gait  x  10 feet with Maximum Assistance using Rolling Walker.   6. Stand for 2 minutes with Moderate Assistance using Rolling Walker  7. Lower extremity exercise program x30 reps per handout, with supervision to improve overall muscle strength                PLAN:    Patient to be seen 5 x/week to address the above listed problems via gait training, therapeutic activities, therapeutic exercises, neuromuscular re-education  Plan of Care expires: 02/04/17  Plan of Care reviewed with: patient, daughter          Luana PASTOR Cornelio, PT, DPT  01/05/2017

## 2017-01-05 NOTE — H&P
[facility] Hospital Medicine  History & Physical    Patient Name: Melina Boss  MRN: [id number]  Admission Date: 1/4/2017  Attending Physician: Jaspreet Estrada MD   Primary Care Provider: Elvira Em MD    [facility] Medicine Team: [facility] HOSP MED T Maki Neely PA-C     Patient information was obtained from patient, relative(s), past medical records and ER records.     Subjective:     Principal Problem:Compression of lumbar vertebra    Chief Complaint:  Back pain     HPI: Patient is a 89 year old lady with a h/o osteoarthritis, dementia, bradycardia s/p PPM, GERD, OA, and vitamin D deficiency.  She was transferred from [facility] for neurosurgical evaluation of L2 compression fracture.  Daughter at bedside provides most of history.  She reports a sudden change in patient's posture on 1/1/2017 when patient was shifted from her car to her wheelchair by her caregiver.  Since that time, she has complained of LBP.  Today, however, she was unable to perform her ADLs.  CT spine revealed L2 compression fracture with extension into the left pedicle.  She continues to have back pain, but states it is better with pain meds.  She was in her usual state of health prior to this event.  Denies loss of bowel/bladder control.  Denies chest pain, SOB, N/V, fever/chills.  Patient is very hard of hearing and is able to hear best out of L ear.  She uses a wheelchair at baseline.    Past Medical History   Diagnosis Date    Anxiety associated with depression     Arthritis     Encounter for blood transfusion     Fracture of femoral neck, right, closed 4/30/2013    Hypertension     Nausea     Osteoarthritis     Urinary tract infection        Past Surgical History   Procedure Laterality Date    Knee surgery       right TKA    Varicose veins       venous stripping    Cardiac pacemaker placement      Total hip arthroplasty Bilateral     Blepheroplasty      Ear tuck      Deviated septum repair          Review of patient's allergies indicates:   Allergen Reactions    Celexa [citalopram]     Phenergan [promethazine]      Pt states not allergic to phenergan      Bactrim [sulfamethoxazole-trimethoprim] Rash       Current Facility-Administered Medications on File Prior to Encounter   Medication    [COMPLETED] alprazolam tablet 0.25 mg    [COMPLETED] morphine injection 2 mg     Current Outpatient Prescriptions on File Prior to Encounter   Medication Sig    acetaminophen (TYLENOL) 325 MG tablet Take 2 tablets (650 mg total) by mouth every 4 (four) hours as needed.    alprazolam (XANAX) 0.25 MG tablet Take 1 tablet (0.25 mg total) by mouth 2 (two) times daily as needed for Anxiety.    aspirin (ECOTRIN) 81 MG EC tablet Take 81 mg by mouth once daily.    busPIRone (BUSPAR) 5 MG Tab Take 2.5 tablets (12.5 mg) twice a day. (Patient taking differently: 5 mg 2 (two) times daily. )    ciclopirox (PENLAC) 8 % Soln Apply topically nightly.    denosumab (PROLIA) 60 mg/mL Syrg Inject 60 mg into the skin every 6 (six) months.    diclofenac sodium (VOLTAREN) 1 % Gel Apply 2 g topically 4 (four) times daily as needed.    furosemide (LASIX) 20 MG tablet Take 2 tablets (40 mg total) by mouth once daily.    lidocaine (LIDODERM) 5 % Place 1 patch onto the skin once daily. Remove & Discard patch within 12 hours or as directed by MD    loratadine (CLARITIN) 10 mg tablet Take 10 mg by mouth daily as needed for Allergies.    mirtazapine (REMERON) 30 MG tablet Take 1 tablet (30 mg total) by mouth every evening.    omeprazole (PRILOSEC) 20 MG capsule Take 1 capsule (20 mg total) by mouth 2 (two) times daily.    ondansetron (ZOFRAN) 4 MG tablet Take 1 tablet (4 mg total) by mouth every 8 (eight) hours as needed for Nausea.    temazepam (RESTORIL) 15 mg Cap Take 1 capsule (15 mg total) by mouth nightly as needed.    white petrolatum-mineral oil 56.8-42.5% (LACRI-LUBE S.O.P.) 56.8-42.5 % Oint Place into both eyes every  evening.     Family History     Problem Relation (Age of Onset)    Cancer Other    Hypertension Daughter    Stroke Other        Social History Main Topics    Smoking status: Never Smoker    Smokeless tobacco: Never Used    Alcohol use No    Drug use: No    Sexual activity: Not Currently     Review of Systems   Constitutional: Negative for activity change, appetite change, chills, fatigue, fever and unexpected weight change.   HENT: Negative for congestion, rhinorrhea, sore throat, trouble swallowing and voice change.    Eyes: Negative for visual disturbance.   Respiratory: Negative for cough, choking, chest tightness, shortness of breath and wheezing.    Cardiovascular: Negative for chest pain, palpitations and leg swelling.   Gastrointestinal: Negative for abdominal distention, abdominal pain, anal bleeding, blood in stool, constipation, diarrhea, nausea and vomiting.   Endocrine: Negative for cold intolerance, heat intolerance, polydipsia and polyuria.   Genitourinary: Negative for dysuria, flank pain, frequency, hematuria and urgency.   Musculoskeletal: Positive for back pain. Negative for arthralgias, joint swelling and myalgias.   Skin: Negative for color change and rash.   Neurological: Negative for dizziness, seizures, syncope, facial asymmetry, speech difficulty, weakness, light-headedness, numbness and headaches.   Hematological: Negative for adenopathy. Does not bruise/bleed easily.   Psychiatric/Behavioral: Negative for agitation, confusion, hallucinations and suicidal ideas.     Objective:     Vital Signs (Most Recent):  Temp: 97.2 °F (36.2 °C) (01/05/17 0000)  Pulse: 63 (01/05/17 0000)  Resp: 18 (01/05/17 0000)  BP: 125/61 (01/05/17 0000)  SpO2: 100 % (01/05/17 0000) Vital Signs (24h Range):  Temp:  [96.9 °F (36.1 °C)-98.5 °F (36.9 °C)] 97.2 °F (36.2 °C)  Pulse:  [63-89] 63  Resp:  [18-20] 18  BP: (104-174)/(55-88) 125/61     Weight: 49.9 kg (110 lb)  Body mass index is 18.88 kg/(m^2).    Physical  Exam   Constitutional: She is oriented to person, place, and time. She appears well-developed and well-nourished. No distress.   HENT:   Head: Normocephalic and atraumatic.   Eyes: Pupils are equal, round, and reactive to light.   Neck: Neck supple. Carotid bruit is not present. No thyromegaly present.   Cardiovascular: Normal rate and regular rhythm.  Exam reveals no gallop.    No murmur heard.  Pulmonary/Chest: Effort normal and breath sounds normal. No respiratory distress. She has no wheezes.   Abdominal: Soft. Bowel sounds are normal. She exhibits no distension and no mass. There is no splenomegaly or hepatomegaly. There is no tenderness.   Musculoskeletal: Normal range of motion. She exhibits no edema or deformity.   Neurological: She is alert and oriented to person, place, and time. No cranial nerve deficit or sensory deficit.   Skin: Skin is warm and dry. No rash noted.   Psychiatric: She has a normal mood and affect.        Significant Labs: All pertinent labs within the past 24 hours have been reviewed.    Significant Imaging: I have reviewed all pertinent imaging results/findings within the past 24 hours.    Assessment/Plan:     * Compression of lumbar vertebra  - Appreciate neurosurgery's assistance.  Family does not wish to pursue surgical options.  Patient is nonambulatory at baseline.  LSO brace.  - Will order sitting and supine x-rays in brace.  - Supportive care.      Vitamin D deficiency  - Will check vitamin D levels.      Cardiac pacemaker  - Placed secondary to bradycardia.  Stable.      Dementia  - Continue Remeron 30mg qHS, Xanax 0.25mg BID PRN.  - Daughter notes patient tends to sundown.    DELIRIUM BEHAVIOR MANAGEMENT  - Minimize use of restraints; if physical restraints necessary, please utilize medical/chemical prns for agitation.  - Keep shades open and room lit during day and room dim at night in order to promote healthy circadian rhythms.  - Encourage family at bedside  - Keep  whiteboard in patient's room current with the date and name of the members of patient's team for easy patient self re-orientation.  - Avoid benzodiazepines, antihistamines, anticholinergics, hypnotics, and minimize opiates while controlling for pain as these medications may exacerbate delirium.      VTE Risk Mitigation         Ordered     Medium Risk of VTE  Once      01/04/17 2357     Place SAUL hose  Until discontinued      01/04/17 2357     Place sequential compression device  Until discontinued      01/04/17 2357        Maki Neely PA-C  Department of Hospital Medicine   [facility]

## 2017-01-05 NOTE — SUBJECTIVE & OBJECTIVE
Past Medical History   Diagnosis Date    Anxiety associated with depression     Arthritis     Encounter for blood transfusion     Fracture of femoral neck, right, closed 4/30/2013    Hypertension     Nausea     Osteoarthritis     Urinary tract infection        Past Surgical History   Procedure Laterality Date    Knee surgery       right TKA    Varicose veins       venous stripping    Cardiac pacemaker placement      Total hip arthroplasty Bilateral     Blepheroplasty      Ear tuck      Deviated septum repair         Review of patient's allergies indicates:   Allergen Reactions    Celexa [citalopram]     Phenergan [promethazine]      Pt states not allergic to phenergan      Bactrim [sulfamethoxazole-trimethoprim] Rash       Current Facility-Administered Medications on File Prior to Encounter   Medication    [COMPLETED] alprazolam tablet 0.25 mg    [COMPLETED] morphine injection 2 mg     Current Outpatient Prescriptions on File Prior to Encounter   Medication Sig    acetaminophen (TYLENOL) 325 MG tablet Take 2 tablets (650 mg total) by mouth every 4 (four) hours as needed.    alprazolam (XANAX) 0.25 MG tablet Take 1 tablet (0.25 mg total) by mouth 2 (two) times daily as needed for Anxiety.    aspirin (ECOTRIN) 81 MG EC tablet Take 81 mg by mouth once daily.    busPIRone (BUSPAR) 5 MG Tab Take 2.5 tablets (12.5 mg) twice a day. (Patient taking differently: 5 mg 2 (two) times daily. )    ciclopirox (PENLAC) 8 % Soln Apply topically nightly.    denosumab (PROLIA) 60 mg/mL Syrg Inject 60 mg into the skin every 6 (six) months.    diclofenac sodium (VOLTAREN) 1 % Gel Apply 2 g topically 4 (four) times daily as needed.    furosemide (LASIX) 20 MG tablet Take 2 tablets (40 mg total) by mouth once daily.    lidocaine (LIDODERM) 5 % Place 1 patch onto the skin once daily. Remove & Discard patch within 12 hours or as directed by MD    loratadine (CLARITIN) 10 mg tablet Take 10 mg by mouth daily as  needed for Allergies.    mirtazapine (REMERON) 30 MG tablet Take 1 tablet (30 mg total) by mouth every evening.    omeprazole (PRILOSEC) 20 MG capsule Take 1 capsule (20 mg total) by mouth 2 (two) times daily.    ondansetron (ZOFRAN) 4 MG tablet Take 1 tablet (4 mg total) by mouth every 8 (eight) hours as needed for Nausea.    temazepam (RESTORIL) 15 mg Cap Take 1 capsule (15 mg total) by mouth nightly as needed.    white petrolatum-mineral oil 56.8-42.5% (LACRI-LUBE S.O.P.) 56.8-42.5 % Oint Place into both eyes every evening.     Family History     Problem Relation (Age of Onset)    Cancer Other    Hypertension Daughter    Stroke Other        Social History Main Topics    Smoking status: Never Smoker    Smokeless tobacco: Never Used    Alcohol use No    Drug use: No    Sexual activity: Not Currently     Review of Systems   Constitutional: Negative for activity change, appetite change, chills, fatigue, fever and unexpected weight change.   HENT: Negative for congestion, rhinorrhea, sore throat, trouble swallowing and voice change.    Eyes: Negative for visual disturbance.   Respiratory: Negative for cough, choking, chest tightness, shortness of breath and wheezing.    Cardiovascular: Negative for chest pain, palpitations and leg swelling.   Gastrointestinal: Negative for abdominal distention, abdominal pain, anal bleeding, blood in stool, constipation, diarrhea, nausea and vomiting.   Endocrine: Negative for cold intolerance, heat intolerance, polydipsia and polyuria.   Genitourinary: Negative for dysuria, flank pain, frequency, hematuria and urgency.   Musculoskeletal: Positive for back pain. Negative for arthralgias, joint swelling and myalgias.   Skin: Negative for color change and rash.   Neurological: Negative for dizziness, seizures, syncope, facial asymmetry, speech difficulty, weakness, light-headedness, numbness and headaches.   Hematological: Negative for adenopathy. Does not bruise/bleed easily.    Psychiatric/Behavioral: Negative for agitation, confusion, hallucinations and suicidal ideas.     Objective:     Vital Signs (Most Recent):  Temp: 97.2 °F (36.2 °C) (01/05/17 0000)  Pulse: 63 (01/05/17 0000)  Resp: 18 (01/05/17 0000)  BP: 125/61 (01/05/17 0000)  SpO2: 100 % (01/05/17 0000) Vital Signs (24h Range):  Temp:  [96.9 °F (36.1 °C)-98.5 °F (36.9 °C)] 97.2 °F (36.2 °C)  Pulse:  [63-89] 63  Resp:  [18-20] 18  BP: (104-174)/(55-88) 125/61     Weight: 49.9 kg (110 lb)  Body mass index is 18.88 kg/(m^2).    Physical Exam   Constitutional: She is oriented to person, place, and time. She appears well-developed and well-nourished. No distress.   HENT:   Head: Normocephalic and atraumatic.   Eyes: Pupils are equal, round, and reactive to light.   Neck: Neck supple. Carotid bruit is not present. No thyromegaly present.   Cardiovascular: Normal rate and regular rhythm.  Exam reveals no gallop.    No murmur heard.  Pulmonary/Chest: Effort normal and breath sounds normal. No respiratory distress. She has no wheezes.   Abdominal: Soft. Bowel sounds are normal. She exhibits no distension and no mass. There is no splenomegaly or hepatomegaly. There is no tenderness.   Musculoskeletal: Normal range of motion. She exhibits no edema or deformity.   Neurological: She is alert and oriented to person, place, and time. No cranial nerve deficit or sensory deficit.   Skin: Skin is warm and dry. No rash noted.   Psychiatric: She has a normal mood and affect.        Significant Labs: All pertinent labs within the past 24 hours have been reviewed.    Significant Imaging: I have reviewed all pertinent imaging results/findings within the past 24 hours.

## 2017-01-05 NOTE — NURSING
"Pt was awoken at 0500 to provide incontinence care. Once awake pt yelled/moaned none stop about "being sick", "broken leg", "wanting to get up", and "not doing this to her". Pt unable to answer any orientation questions. Oxy IR and xanax given over 45 min time period. I sat with pt until she was able to calm down and fall back asleep.   "

## 2017-01-05 NOTE — PLAN OF CARE
Problem: Physical Therapy Goal  Goal: Physical Therapy Goal  Goals to be met by: 7 days ()    Patient will increase functional independence with mobility by performin. Supine to sit with MInimal Assistance  2. Sit to supine with MInimal Assistance  3. Sit to stand transfer with Minimal Assistance  4. Bed to chair transfer with Moderate Assistance using Rolling Walker  5. Gait x 10 feet with Maximum Assistance using Rolling Walker.   6. Stand for 2 minutes with Moderate Assistance using Rolling Walker  7. Lower extremity exercise program x30 reps per handout, with supervision to improve overall muscle strength  Outcome: Ongoing (interventions implemented as appropriate)  PT eval completed. POC initiated.     Luana Grimes, PT, DPT  2017

## 2017-01-05 NOTE — PLAN OF CARE
Elvira Em MD  [address] / [place] [address]    Future Appointments  Date Time Provider Department Center   2/6/2017 2:00 PM Yung Trevizo DPM [facility] P[facility] [place]   4/25/2017 8:00 AM [facility] [place] [facility]   4/26/2017 10:00 AM Etta Shane MD [facility] [place] [facility]   6/20/2017 3:20 PM Elvira Em MD [facility] [place]         [id number] - [place], [place] - [address]  [address]  [place] [address]  Phone: [phone] Fax: [phone]      Payor: MEDICARE / Plan: MEDICARE PART A & B / Product Type: Government /        01/05/17 1543   Discharge Assessment   Assessment Type Discharge Planning Assessment   Confirmed/corrected address and phone number on facesheet? Yes   Assessment information obtained from? Caregiver;Medical Record   Prior to hospitilization cognitive status: Not Oriented to Time   Prior to hospitalization functional status: Assistive Equipment   Current cognitive status: Not Oriented to Time   Current Functional Status: Assistive Equipment   Arrived From (transfer from [facility])   Lives With (lives in the back apartment by her granddaughter's house with 24 hour sitters )   Able to Return to Prior Arrangements no   Is patient able to care for self after discharge? No   Who are your caregiver(s) and their phone number(s)? (Annetta Farrar  daughter [phone] )   Patient's perception of discharge disposition (Patient's daughter has a bed on hold at [place] [facility] in [place]  contact Gabrielle [phone] with [place] consult )   Patient currently receives home health services? No   Patient currently receives private duty nursing? Yes   How many hours of private duty nursing does the patient receive? 24   Equipment Currently Used at Home wheelchair   Does the patient have transportation to healthcare appointments? Yes   Transportation Available family or friend will provide   On Dialysis? No   Discharge Plan A  (discharge to [place] [facility] in [place]  )   Patient/Family In Agreement With Plan yes

## 2017-01-05 NOTE — ASSESSMENT & PLAN NOTE
- Continue Remeron 30mg qHS, Xanax 0.25mg BID PRN.  - Daughter notes patient tends to sundown.    DELIRIUM BEHAVIOR MANAGEMENT  - Minimize use of restraints; if physical restraints necessary, please utilize medical/chemical prns for agitation.  - Keep shades open and room lit during day and room dim at night in order to promote healthy circadian rhythms.  - Encourage family at bedside  - Keep whiteboard in patient's room current with the date and name of the members of patient's team for easy patient self re-orientation.  - Avoid benzodiazepines, antihistamines, anticholinergics, hypnotics, and minimize opiates while controlling for pain as these medications may exacerbate delirium.

## 2017-01-05 NOTE — PLAN OF CARE
Problem: Patient Care Overview  Goal: Plan of Care Review  Outcome: Ongoing (interventions implemented as appropriate)  POC reviewed with pt, daughter and caretaker. Pt transferred from ED and was emotional distressed. Pt was re-oriented and was able to be able to be calmed down and fell asleep. Pt took meds crushed in pudding. Communication board is in her room. Pt can only hear if spoken closely into her left ear. Pt remained free from falls; no new skin breakdown noted.

## 2017-01-06 LAB
25(OH)D3+25(OH)D2 SERPL-MCNC: 25 NG/ML
ALBUMIN SERPL BCP-MCNC: 3 G/DL
ALP SERPL-CCNC: 73 U/L
ALT SERPL W/O P-5'-P-CCNC: 11 U/L
ANION GAP SERPL CALC-SCNC: 5 MMOL/L
AST SERPL-CCNC: 19 U/L
BILIRUB DIRECT SERPL-MCNC: 0.5 MG/DL
BILIRUB SERPL-MCNC: 1 MG/DL
BUN SERPL-MCNC: 33 MG/DL
CALCIUM SERPL-MCNC: 8.4 MG/DL
CHLORIDE SERPL-SCNC: 104 MMOL/L
CO2 SERPL-SCNC: 34 MMOL/L
CREAT SERPL-MCNC: 0.9 MG/DL
ERYTHROCYTE [DISTWIDTH] IN BLOOD BY AUTOMATED COUNT: 16 %
EST. GFR  (AFRICAN AMERICAN): >60 ML/MIN/1.73 M^2
EST. GFR  (NON AFRICAN AMERICAN): 54.1 ML/MIN/1.73 M^2
GLUCOSE SERPL-MCNC: 111 MG/DL
HCT VFR BLD AUTO: 40.8 %
HGB BLD-MCNC: 12.1 G/DL
MCH RBC QN AUTO: 26.8 PG
MCHC RBC AUTO-ENTMCNC: 29.7 %
MCV RBC AUTO: 90 FL
PLATELET # BLD AUTO: 184 K/UL
PMV BLD AUTO: 10 FL
POTASSIUM SERPL-SCNC: 4 MMOL/L
PROT SERPL-MCNC: 6.7 G/DL
RBC # BLD AUTO: 4.52 M/UL
SODIUM SERPL-SCNC: 143 MMOL/L
WBC # BLD AUTO: 7.68 K/UL

## 2017-01-06 PROCEDURE — G0378 HOSPITAL OBSERVATION PER HR: HCPCS

## 2017-01-06 PROCEDURE — G8988 SELF CARE GOAL STATUS: HCPCS | Mod: CL

## 2017-01-06 PROCEDURE — 85027 COMPLETE CBC AUTOMATED: CPT

## 2017-01-06 PROCEDURE — 97166 OT EVAL MOD COMPLEX 45 MIN: CPT

## 2017-01-06 PROCEDURE — 99225 PR SUBSEQUENT OBSERVATION CARE,LEVEL II: CPT | Mod: ,,, | Performed by: PHYSICIAN ASSISTANT

## 2017-01-06 PROCEDURE — G8996 SWALLOW CURRENT STATUS: HCPCS | Mod: CL

## 2017-01-06 PROCEDURE — 82306 VITAMIN D 25 HYDROXY: CPT

## 2017-01-06 PROCEDURE — 80076 HEPATIC FUNCTION PANEL: CPT

## 2017-01-06 PROCEDURE — G8987 SELF CARE CURRENT STATUS: HCPCS | Mod: CM

## 2017-01-06 PROCEDURE — 36415 COLL VENOUS BLD VENIPUNCTURE: CPT

## 2017-01-06 PROCEDURE — 92611 MOTION FLUOROSCOPY/SWALLOW: CPT

## 2017-01-06 PROCEDURE — 25000003 PHARM REV CODE 250: Performed by: PHYSICIAN ASSISTANT

## 2017-01-06 PROCEDURE — 80048 BASIC METABOLIC PNL TOTAL CA: CPT

## 2017-01-06 PROCEDURE — G8997 SWALLOW GOAL STATUS: HCPCS | Mod: CL

## 2017-01-06 RX ORDER — QUETIAPINE FUMARATE 25 MG/1
25 TABLET, FILM COATED ORAL ONCE
Status: COMPLETED | OUTPATIENT
Start: 2017-01-06 | End: 2017-01-06

## 2017-01-06 RX ADMIN — CETIRIZINE HYDROCHLORIDE 5 MG: 5 TABLET, FILM COATED ORAL at 09:01

## 2017-01-06 RX ADMIN — OXYCODONE HYDROCHLORIDE 5 MG: 5 TABLET ORAL at 10:01

## 2017-01-06 RX ADMIN — STANDARDIZED SENNA CONCENTRATE AND DOCUSATE SODIUM 1 TABLET: 8.6; 5 TABLET, FILM COATED ORAL at 09:01

## 2017-01-06 RX ADMIN — ALPRAZOLAM 0.25 MG: 0.25 TABLET ORAL at 05:01

## 2017-01-06 RX ADMIN — BUSPIRONE HYDROCHLORIDE 5 MG: 5 TABLET ORAL at 09:01

## 2017-01-06 RX ADMIN — OXYCODONE HYDROCHLORIDE 5 MG: 5 TABLET ORAL at 05:01

## 2017-01-06 RX ADMIN — ASPIRIN 81 MG: 81 TABLET, COATED ORAL at 09:01

## 2017-01-06 RX ADMIN — QUETIAPINE FUMARATE 25 MG: 25 TABLET, FILM COATED ORAL at 09:01

## 2017-01-06 RX ADMIN — MIRTAZAPINE 30 MG: 30 TABLET, FILM COATED ORAL at 09:01

## 2017-01-06 RX ADMIN — FUROSEMIDE 40 MG: 40 TABLET ORAL at 09:01

## 2017-01-06 RX ADMIN — PANTOPRAZOLE SODIUM 40 MG: 40 TABLET, DELAYED RELEASE ORAL at 09:01

## 2017-01-06 NOTE — SUBJECTIVE & OBJECTIVE
Interval History: Multiple episodes of increased confusion overnight and today. Patient reports continued lower back pain at times during day, other times able to rest well. Daughter went to see NH where patient was supposed to be discharged to, but they are unable to administer pain medication at night and fully accommodate patients needs, so daughter requesting assistance with NH placement at this time.     Review of Systems   Unable to perform ROS: Dementia     Objective:     Vital Signs (Most Recent):  Temp: 97.8 °F (36.6 °C) (01/06/17 1713)  Pulse: 65 (01/06/17 1713)  Resp: 18 (01/06/17 1713)  BP: (!) 141/80 (01/06/17 1713)  SpO2: 99 % (01/06/17 1713) Vital Signs (24h Range):  Temp:  [97.4 °F (36.3 °C)-98.1 °F (36.7 °C)] 97.8 °F (36.6 °C)  Pulse:  [62-86] 65  Resp:  [16-20] 18  BP: (123-163)/(60-80) 141/80     Weight: 49.9 kg (110 lb)  Body mass index is 17.75 kg/(m^2).    Intake/Output Summary (Last 24 hours) at 01/06/17 1716  Last data filed at 01/06/17 0000   Gross per 24 hour   Intake           586.25 ml   Output                0 ml   Net           586.25 ml      Physical Exam   Constitutional: She appears well-developed and well-nourished. No distress.   HENT:   Head: Normocephalic and atraumatic.   Neck: Carotid bruit is not present.   Cardiovascular: Normal rate and regular rhythm.  Exam reveals no gallop.    No murmur heard.  Pulmonary/Chest: Effort normal and breath sounds normal. No respiratory distress. She has no wheezes.   Abdominal: Soft. Bowel sounds are normal. She exhibits no distension. There is no splenomegaly or hepatomegaly. There is no tenderness.   Musculoskeletal: Normal range of motion. She exhibits tenderness (Lumbar spine). She exhibits no edema or deformity.   Neurological: She is alert. No cranial nerve deficit or sensory deficit.   Oriented to person, disoriented to place, time. Episodes of increased confusion intermittently throughout day.        Significant Labs: All pertinent  labs within the past 24 hours have been reviewed.    Significant Imaging: I have reviewed all pertinent imaging results/findings within the past 24 hours.

## 2017-01-06 NOTE — PLAN OF CARE
Problem: Patient Care Overview  Goal: Plan of Care Review  Outcome: Ongoing (interventions implemented as appropriate)  Upon initial assessment, pt was very anxious/ restless, responding to treatment and questions.  When returning to administer meds, pt was sleeping and not easily arousable.  All vital signs stable.  After continuous effort, pt began responding and was seen to be at baseline.  Notified MD of situation.  Pt is disoriented to time, place, and situation.  Pt is on 2L nasal cannula.  Meds crushed and administered in pudding.  No falls or injuries to occur.  Bed alarm on.  Camera at bedside for safety.

## 2017-01-06 NOTE — PT/OT/SLP EVAL
Occupational Therapy  Evaluation    Melina Boss   MRN: [id number]   Admitting Diagnosis: Compression of lumbar vertebra    OT Date of Treatment: 01/06/17   OT Start Time: 1329  OT Stop Time: 1400  OT Total Time (min): 31 min    Billable Minutes:  Evaluation 31    Diagnosis: Compression of lumbar vertebra       Past Medical History   Diagnosis Date    Anxiety associated with depression     Arthritis     Encounter for blood transfusion     Fracture of femoral neck, right, closed 4/30/2013    Hypertension     Nausea     Osteoarthritis     Urinary tract infection       Past Surgical History   Procedure Laterality Date    Knee surgery       right TKA    Varicose veins       venous stripping    Cardiac pacemaker placement      Total hip arthroplasty Bilateral     Blepheroplasty      Ear tuck      Deviated septum repair         Referring physician: Dr Estrada  Date referred to OT: 1/5/17    General Precautions: Standard, aspiration, fall, hearing impaired  Orthopedic Precautions: N/A  Braces: LSO    Do you have any cultural, spiritual, [employer]aritan conflicts, given your current situation?: None     Patient History:  Living Environment  Lives With:  (Pt was living in and Apt next door to granddaughter but most recently  was being provided with   24/7 care in home setting. Pt needing Assist with all aspects of car and was non ambulatory.)  Living Arrangements: house  Home Accessibility:  (no concerns)  Transportation Available: family or friend will provide  Living Environment Comment: Pt's daughter indicating that Pt living at home next door to granddaughter with   24/7 sitters, but Pt fell in eb 2016 and was admitted to [facility] when she stayed for 30 days  receiving therapy intervention. Pt was Then moved to [facility] where she redised from March to   September and then home with 24/7 care. Plan is for Pt to be D/[facility] to [facility] .   Equipment Currently  Used at Home: wheelchair    Prior level of function:   Bed Mobility/Transfers: needs device and assist  Grooming: needs assist  Bathing: needs device and assist  Upper Body Dressing: needs assist  Lower Body Dressing: needs assist  Toileting: needs device and assist  Home Management Skills: needs assist  Mode of Transportation: Family  Occupation: Unemployed  Leisure and Hobbies: None stated.  IADL Comments: Pt required assist with all ADLs and mobility per daughter's report and family had   24/7 assist when Pt was home.      Dominant hand: right    Subjective:  Communicated with nurse prior to session.    Chief Complaint: Compression of lumbar vertebra  Patient/Family stated goals: None stated    Pain Rating:  (Pt reporting back pain but unable to rate pain.)  Location - Side: Bilateral  Location - Orientation: lower  Location: back  Pain Addressed: Reposition, Distraction, Pre-medicate for activity, Nurse notified  Pain Rating Post-Intervention:  (same)    Objective:  Patient found with: peripheral IV, telemetry, TLSO    Cognitive Exam:  Oriented to: Person, Place, Time and Situation  Follows Commands/attention: Follows one-step commands  Communication: clear/fluent  Memory:  Impaired STM and Impaired LTM  Safety awareness/insight to disability: impaired  Coping skills/emotional control: Appropriate to situation    Visual/perceptual:  ESTER    Physical Exam:  Postural examination/scapula alignment: Rounded shoulder, Head forward and Posterior pelvic tilt  Skin integrity: Visible skin intact  Edema: None noted (B) UES    Sensation:   Intact    Upper Extremity Range of Motion:  Right Upper Extremity: WFLs with PROM and A/AROM  Left Upper Extremity: WFLS with PROM and A/AROM    Upper Extremity Strength:  Right Upper Extremity: Greatly diminished with strength currently poor throughout   Left Upper Extremity: Greatly diminished with strength currently poor throughout    Strength: Poor (B) UEs    Fine motor  coordination:   Pt with diminished fine motor manipulation with difficulty manipulating objects.    Gross motor coordination: Intact but diminished UE strength limiting performance of functional tasks,    Functional Mobility:  Bed Mobility:  Rolling/Turning to Left: Total assistance  Rolling/Turning Right: Total assistance  Scooting/Bridging: Dependent  Supine to Sit: Total Assistance  Sit to Supine: Total Assistance    Transfers:  Sit <> Stand Assistance: Total Assistance  Sit <> Stand Assistive Device: No Assistive Device        Activities of Daily Living:  Feeding Level of Assistance: Activity did not occur    UE Dressing Level of Assistance: Maximum assistance (with Pt assisting by threading (L)   E into sleeve o pajama shirt. (A) needed to don/doff LSO. with Pt sitting EOB unsupported.)    LE Dressing Level of Assistance: Total assistance (EOB with only  donning/doffing of socks attempted.)    Grooming Position: Seated, EOB  Grooming Level of Assistance: Maximum assistance (with Pt only able to wash face.)     Toileting Where Assessed: Toilet, Bed level  Toileting Level of Assistance: Total assistance      Balance:   Static Sit: FAIR: Maintains without assist, but unable to take any challenges   Dynamic Sit: FAIR: Cannot move trunk without losing balance  Static Stand: POOR: Needs MODERATE assist to maintain  Dynamic stand: POOR: N/A    Therapeutic Activities :  OT evaluation performed.  White board updated.  Pt educated on role of OT, safety with functional mobility and ADLs    AM-PAC 6 CLICK ADL  How much help from another person does this patient currently need?  1 = Unable, Total/Dependent Assistance  2 = A lot, Maximum/Moderate Assistance  3 = A little, Minimum/Contact Guard/Supervision  4 = None, Modified [place]/Independent    Putting on and taking off regular lower body clothing? : 1  Bathing (including washing, rinsing, drying)?: 1  Toileting, which includes using toilet, bedpan, or urinal? :  "1  Putting on and taking off regular upper body clothing?: 2  Taking care of personal grooming such as brushing teeth?: 2  Eating meals?: 1  Total Score: 8    AM-PAC Raw Score CMS "G-Code Modifier Level of Impairment Assistance   6 % Total / Unable   7 - 9 CM 80 - 100% Maximal Assist   10 - 14 CL 60 - 80% Moderate Assist   15 - 19 CK 40 - 60% Moderate Assist   20 - 22 CJ 20 - 40% Minimal Assist   23 CI 1-20% SBA / CGA   24 CH 0% Independent/ Mod I       Patient left HOB elevated with all lines intact, call button in reach, nurse notified and daughter present    Assessment:  Melina Boss is a 89 y.o. female with a medical diagnosis of Compression of lumbar vertebra and presents   Decreased performance of all activities. Pt requires assist to perform all self care tasks as well as functional   mobility and transfers.  Pt has diminished hearing and vision and demonstrated generalized weakness.   Pt requires W/C for mobility and at this time would require lift system if family were to assist with transfers.  Pt to be seen by OT to address deficit areas.    Rehab identified problem list/impairments: Rehab identified problem list/impairments: weakness,   impaired endurance, gait instability, impaired functional mobilty, impaired self care skills, impaired balance,   visual deficits, decreased lower extremity function, decreased upper extremity function, decreased   coordination, impaired cognition, decreased safety awareness, pain, impaired fine motor, impaired coordination,   decreased ROM    Rehab potential is fair.    Activity tolerance: Fair    Discharge recommendations: Discharge:SNF but  (Pt's family plans to send Pt to [facility])     Barriers to discharge: Barriers to Discharge: Decreased caregiver support    Equipment recommendations:  (TBD depending on progress.)     GOALS:   Occupational Therapy Goals     Goals to be met by: 1/20/17     Patient will increase functional " independence with ADLs by performing:    Feeding with Minimal Assistance.  UE Dressing with Moderate Assistance.  LE Dressing with Maximum Assistance.  Grooming while seated with Moderate Assistance.  Supine to sit with Moderate Assistance.  Stand pivot transfers with Moderate Assistance.            PLAN:  Patient to be seen 5x/week to address the above listed problems via self-care/home management, therapeutic activities, therapeutic exercises  Plan of Care expires: 02/06/17  Plan of Care reviewed with: patient, daughter         Gentry BLOOD Jelani, OTR/TEA  01/06/2017

## 2017-01-06 NOTE — PROGRESS NOTES
[facility] Hospital Medicine  Progress Note    Patient Name: Melina Boss  MRN: [id number]  Patient Class: OP- Observation   Admission Date: 1/4/2017  Length of Stay: 0 days  Expected Discharge Date: 1/6/2017  Attending Physician: Jaspreet Estrada MD  Primary Care Provider: Evlira Em MD    [facility] Medicine Team: [facility] MED E Nohemi Pedersen PA-C    Subjective:     Principal Problem:Compression of lumbar vertebra    HPI:  Patient is a 89 year old lady with a h/o osteoarthritis, dementia, bradycardia s/p PPM, GERD, OA, and vitamin D deficiency.  She was transferred from [facility] for neurosurgical evaluation of L2 compression fracture.  Daughter at bedside provides most of history.  She reports a sudden change in patient's posture on 1/1/2017 when patient was shifted from her car to her wheelchair by her caregiver.  Since that time, she has complained of LBP.  Today, however, she was unable to perform her ADLs.  CT spine revealed L2 compression fracture with extension into the left pedicle.  She continues to have back pain, but states it is better with pain meds.  She was in her usual state of health prior to this event.  Denies loss of bowel/bladder control.  Denies chest pain, SOB, N/V, fever/chills.  Patient is very hard of hearing and is able to hear best out of L ear.  She uses a wheelchair at baseline.    Hospital Course:  89 yo female admitted to observation for further evaluation and treatment of compression of lumbar vertebra. Neurosurgery consulted, family does not wish to pursue surgical options at this time, LSO brace, repeat XR in brace (supine and sitting) pending. Continue supportive care and pain control. PT/OT consulted, recommending NH SNF. CM/SW assisting in placement. Patient had prior placement in NH facility in [place], but they are unable to accommodate patients needs at this time. Will continue pain control until placement. SLP consulted, barium swallow performed,  recommendations added to patient diet to minimize risk of aspiration.    Interval History: Multiple episodes of increased confusion overnight and today. Patient reports continued lower back pain at times during day, other times able to rest well. Daughter went to see NH where patient was supposed to be discharged to, but they are unable to administer pain medication at night and fully accommodate patients needs, so daughter requesting assistance with NH placement at this time.     Review of Systems   Unable to perform ROS: Dementia     Objective:     Vital Signs (Most Recent):  Temp: 97.8 °F (36.6 °C) (01/06/17 1713)  Pulse: 65 (01/06/17 1713)  Resp: 18 (01/06/17 1713)  BP: (!) 141/80 (01/06/17 1713)  SpO2: 99 % (01/06/17 1713) Vital Signs (24h Range):  Temp:  [97.4 °F (36.3 °C)-98.1 °F (36.7 °C)] 97.8 °F (36.6 °C)  Pulse:  [62-86] 65  Resp:  [16-20] 18  BP: (123-163)/(60-80) 141/80     Weight: 49.9 kg (110 lb)  Body mass index is 17.75 kg/(m^2).    Intake/Output Summary (Last 24 hours) at 01/06/17 1716  Last data filed at 01/06/17 0000   Gross per 24 hour   Intake           586.25 ml   Output                0 ml   Net           586.25 ml      Physical Exam   Constitutional: She appears well-developed and well-nourished. No distress.   HENT:   Head: Normocephalic and atraumatic.   Neck: Carotid bruit is not present.   Cardiovascular: Normal rate and regular rhythm.  Exam reveals no gallop.    No murmur heard.  Pulmonary/Chest: Effort normal and breath sounds normal. No respiratory distress. She has no wheezes.   Abdominal: Soft. Bowel sounds are normal. She exhibits no distension. There is no splenomegaly or hepatomegaly. There is no tenderness.   Musculoskeletal: Normal range of motion. She exhibits tenderness (Lumbar spine). She exhibits no edema or deformity.   Neurological: She is alert. No cranial nerve deficit or sensory deficit.   Oriented to person, disoriented to place, time. Episodes of increased confusion  intermittently throughout day.        Significant Labs: All pertinent labs within the past 24 hours have been reviewed.    Significant Imaging: I have reviewed all pertinent imaging results/findings within the past 24 hours.    Assessment/Plan:      * Compression of lumbar vertebra  - Appreciate neurosurgery's assistance.  Family does not wish to pursue surgical options.  Patient is nonambulatory at baseline.  LSO brace.  - Sitting and supine x-rays in brace with L2 compression fracture with no change in alignment of the lumbar spine on the sitting and upright views. Neurosurgery signed off, will arrange 8 week follow up.   - Supportive care. Continue oral analgesics PRN. Patient with intermittent episodes of increased confusion.   - PT/OT consults recommending NH SNF. CM/SW assisting with placement, patient was to be discharged to facility in [place] today but family visited to sign paperwork and report facility is unable to accommodate patients needs and they would like to pursue alternative placement. Family reports they are unable to care for patient at home.       Vitamin D deficiency  - Mild vitamin D deficiency (Vitamin D level 25), will recommend OTC vitamin D replacement on discharge    Cardiac pacemaker  - Placed secondary to bradycardia.  Stable.      Dementia without behavioral disturbance  - Continue Remeron 30mg qHS, Xanax 0.25mg BID PRN.  - Daughter notes patient tends to sundown.  - Given Seroquel 25 mg x1 today for increased agitation.    DELIRIUM BEHAVIOR MANAGEMENT  - Minimize use of restraints; if physical restraints necessary, please utilize medical/chemical prns for agitation.  - Keep shades open and room lit during day and room dim at night in order to promote healthy circadian rhythms.  - Encourage family at bedside  - Keep whiteboard in patient's room current with the date and name of the members of patient's team for easy patient self re-orientation.  - Avoid benzodiazepines,  antihistamines, anticholinergics, hypnotics, and minimize opiates while controlling for pain as these medications may exacerbate delirium.      VTE Risk Mitigation         Ordered     Medium Risk of VTE  Once      01/04/17 2357     Place SAUL hose  Until discontinued      01/04/17 2357     Place sequential compression device  Until discontinued      01/04/17 2357          Nohemi Pedersen PA-C  Department of Hospital Medicine   [facility]  Staff: Dr. Estrada

## 2017-01-06 NOTE — PROCEDURES
Modifed Barium Swallow Study  Speech Start Time: 1551  Speech Stop Time: 1630  Speech Total (min): 39 min    SLP Treatment Date: 01/06/17    Reason for Referral  Patient was referred for a Modified Barium Swallow Study to assess the efficiency of his/her swallow function, rule out aspiration and make recommendations regarding safe dietary consistencies, effective compensatory strategies, and safe eating environment.     Diagnosis   Compression of lumbar vertebra    Past Medical History   Diagnosis Date    Anxiety associated with depression     Arthritis     Encounter for blood transfusion     Fracture of femoral neck, right, closed 4/30/2013    Hypertension     Nausea     Osteoarthritis     Urinary tract infection      Past Surgical History   Procedure Laterality Date    Knee surgery       right TKA    Varicose veins       venous stripping    Cardiac pacemaker placement      Total hip arthroplasty Bilateral     Blepheroplasty      Ear tuck      Deviated septum repair          General Precautions: aspiration, fall, hearing impaired, honey thick, pureed diet       Recommendations  Pt appears to be at risk for aspiration over the course of intake with all liquid consistencies tested due to presence of residue.  However, if pt, family, and team do not wish to keep pt NPO, the safest PO diet is Puree with HONEY thick liquids  -Make efforts to achieve most upright position as possible for PO intake though posture may limit ability to achieve upright position considerably  -Small bites and sips  - No straws  -Alternate bites of puree with cup sips of honey thick liquids  -encourage double swallows, though pt may not be able to follow commands to do so  -crush and bury meds in puree  -closely monitor for overt s/s of aspiration  -cease intake if pt with overt coughing/choking, throat clearing, or wet/gurgly vocal quality    Oral Peripheral Examination  Oral Musculature Evaluation  Oral Musculature: general  weakness  Dentition: scattered dentition  Secretion Management: problems swallowing secretions  Mucosal Quality: adequate  Mandibular Strength and Mobility: WFL  Oral Labial Strength and Mobility: WFL  Lingual Strength and Mobility: impaired strength  Buccal Strength and Mobility: WFL  Volitional Cough: fair volitional with encouragment; weak reflexive  Volitional Swallow: did not follow commands to swallow  Voice Prior to PO Intake: wet/gurgly    Consistencies Assessed  Thin liquids tsp x 1, cup sip x 1, straw sip x 1, Nectar thick liquids cup sip x 1, Honey thick liquids tsp x 1 and Puree 1/2 tsp x 1 and full tsp x 1   Pt with forward lean throughout entire study unable to correct or maintain more upright positioning. This appeared to impact management of PO during pharyngeal phase and contributed to presence of aspiration.  Pt still exhibited decreased airway protection, however, independent of forward posture as evidenced by absence of cough response when penetration and aspiration occurred.    Oral Preparation / Oral Phase  Delayed A-P and slow oral transit for thicker consistencies  Minimal tongue pumping observed  Anterior loss of oral residue of barium mixed with pooled saliva    Pharyngeal Phase  -Delayed swallow response and dec'd tongue base retraction contributed to prespillage into vallecula with thin liquids  -Dec'd epiglottic inversion  - Dec'd hyolaryngeal elevation and excursion   -Vallecular and aryepiglottic fold residue of increasing amounts with thin, nectar, and honey thick liquid trials. Pt spontaneously produced secondary swallows inconsistently and was unable to fully clear residuals resulting in pooling of thin and thickened liquid residuals with eventual penetration and aspiration.  No cough response was present, but pt with wet/gurgly vocal quality.  Unable to determine which consistency was aspirated due to inability to fully clear residuals from previous trial.  Due to cognitive  deficits and severe hearing loss, pt unable to follow commands to produce dry swallows or a volitional cough in an effort to minimize residuals or clear aspiration material.  -Mild vallecular and aryepiglottic fold residue following pureed trials (pureed boluses assisted with clearing residuals from previous liquid trials)    Cervical Esophageal Phase  Unable to view well due to should obstruction and forward lean    Impressions  Moderate oropharyngeal dysphagia with presence of aspiration of residuals from thin and thick liquids. No cough response present, but wet/gurgly vocal quality was present.  Pt with increasing risk of aspiration during the course of a meal due to decreased ability to clear residuals from liquid consistencies.      Prognosis/Plan/Education  Guarded  Pt is at high risk for aspiration for all liquids, but safest PO diet is puree with honey thick liquids  Informed nurse and PA of results and recommendations    Care Plan   SLP Goals        Problem: SLP Goal    Goal Priority Disciplines Outcome   SLP Goal     SLP    Description:  Speech Language Pathology Goals  Goals expected to be met by 1/6:  1. Pt will participate in Modified Barium Swallow Study to determine safest PO diet. - goal met    Goal expected to be me by 1/13:  1. Pt will tolerate pureed diet and honey thick liquids without s/s of aspiration.                      G-Codes  Functional Assessment Tool Used: noms  Score: 3  Functional Limitations: Swallowing  Swallow Current Status (): BENJAMIN  Swallow Goal Status (): ANVIN Loco, CCC-SLP  Speech Language Pathologist  [phone]  1/6/2017

## 2017-01-06 NOTE — ASSESSMENT & PLAN NOTE
- Appreciate neurosurgery's assistance.  Family does not wish to pursue surgical options.  Patient is nonambulatory at baseline.  LSO brace.  - Sitting and supine x-rays in brace with L2 compression fracture with no change in alignment of the lumbar spine on the sitting and upright views. Neurosurgery signed off, will arrange 8 week follow up.   - Supportive care. Continue oral analgesics PRN. Patient with intermittent episodes of increased confusion.   - PT/OT consults recommending NH SNF. CM/SW assisting with placement, patient was to be discharged to facility in [place] today but family visited to sign paperwork and report facility is unable to accommodate patients needs and they would like to pursue alternative placement. Family reports they are unable to care for patient at home.

## 2017-01-06 NOTE — PLAN OF CARE
Discussed with patients granddaughter, Janelle, who is physician at [facility] [place] today. [phone]. [facility]. They would like patient to go to SNF if possible.

## 2017-01-06 NOTE — ASSESSMENT & PLAN NOTE
- Continue Remeron 30mg qHS, Xanax 0.25mg BID PRN.  - Daughter notes patient tends to sundown.  - Given Zyprexa 25 mg x1 today for increased agitation.    DELIRIUM BEHAVIOR MANAGEMENT  - Minimize use of restraints; if physical restraints necessary, please utilize medical/chemical prns for agitation.  - Keep shades open and room lit during day and room dim at night in order to promote healthy circadian rhythms.  - Encourage family at bedside  - Keep whiteboard in patient's room current with the date and name of the members of patient's team for easy patient self re-orientation.  - Avoid benzodiazepines, antihistamines, anticholinergics, hypnotics, and minimize opiates while controlling for pain as these medications may exacerbate delirium.

## 2017-01-06 NOTE — PLAN OF CARE
SW received call from RN in the early afternoon regarding Pt discharge plan. Advised of PA report and tentative plan.     SW received call from Pt daughter reporting that she was at the facility and the conditions for her mothers care would not be acceptable. She does not want her mother placed there. She will look at other facilities over the weekend and will aid with placement on Monday. ELENO advised PA.    Sania Patterson LMSW  PRN   [facility]

## 2017-01-06 NOTE — PLAN OF CARE
Problem: Occupational Therapy Goal  Goal: Occupational Therapy Goal  Goals to be met by: 1/20/17     Patient will increase functional independence with ADLs by performing:    Feeding with Minimal Assistance.  UE Dressing with Moderate Assistance.  LE Dressing with Maximum Assistance.  Grooming while seated with Moderate Assistance.  Supine to sit with Moderate Assistance.  Stand pivot transfers with Moderate Assistance.  Outcome: Ongoing (interventions implemented as appropriate)  Gentry Palomares, CHELSEA/TEA      1/6/2017

## 2017-01-07 LAB
ANION GAP SERPL CALC-SCNC: 7 MMOL/L
BUN SERPL-MCNC: 26 MG/DL
CALCIUM SERPL-MCNC: 8 MG/DL
CHLORIDE SERPL-SCNC: 107 MMOL/L
CO2 SERPL-SCNC: 33 MMOL/L
CREAT SERPL-MCNC: 0.8 MG/DL
ERYTHROCYTE [DISTWIDTH] IN BLOOD BY AUTOMATED COUNT: 15.9 %
EST. GFR  (AFRICAN AMERICAN): >60 ML/MIN/1.73 M^2
EST. GFR  (NON AFRICAN AMERICAN): >60 ML/MIN/1.73 M^2
GLUCOSE SERPL-MCNC: 95 MG/DL
HCT VFR BLD AUTO: 40 %
HGB BLD-MCNC: 12 G/DL
MCH RBC QN AUTO: 26.9 PG
MCHC RBC AUTO-ENTMCNC: 30 %
MCV RBC AUTO: 90 FL
PLATELET # BLD AUTO: 158 K/UL
PMV BLD AUTO: 9.8 FL
POTASSIUM SERPL-SCNC: 3.9 MMOL/L
RBC # BLD AUTO: 4.46 M/UL
SODIUM SERPL-SCNC: 147 MMOL/L
WBC # BLD AUTO: 6.63 K/UL

## 2017-01-07 PROCEDURE — 80048 BASIC METABOLIC PNL TOTAL CA: CPT

## 2017-01-07 PROCEDURE — 25000003 PHARM REV CODE 250: Performed by: PHYSICIAN ASSISTANT

## 2017-01-07 PROCEDURE — 99226 PR SUBSEQUENT OBSERVATION CARE,LEVEL III: CPT | Mod: ,,, | Performed by: PHYSICIAN ASSISTANT

## 2017-01-07 PROCEDURE — 63600175 PHARM REV CODE 636 W HCPCS: Performed by: PHYSICIAN ASSISTANT

## 2017-01-07 PROCEDURE — 36415 COLL VENOUS BLD VENIPUNCTURE: CPT

## 2017-01-07 PROCEDURE — 85027 COMPLETE CBC AUTOMATED: CPT

## 2017-01-07 PROCEDURE — G0378 HOSPITAL OBSERVATION PER HR: HCPCS

## 2017-01-07 PROCEDURE — 86580 TB INTRADERMAL TEST: CPT | Performed by: PHYSICIAN ASSISTANT

## 2017-01-07 RX ORDER — OXYCODONE HYDROCHLORIDE 5 MG/1
5 TABLET ORAL EVERY 4 HOURS PRN
Status: DISCONTINUED | OUTPATIENT
Start: 2017-01-07 | End: 2017-01-08

## 2017-01-07 RX ORDER — HYDROCODONE BITARTRATE AND ACETAMINOPHEN 10; 325 MG/1; MG/1
1 TABLET ORAL EVERY 6 HOURS PRN
Status: DISCONTINUED | OUTPATIENT
Start: 2017-01-07 | End: 2017-01-08

## 2017-01-07 RX ORDER — LIDOCAINE 50 MG/G
1 PATCH TOPICAL
Status: DISCONTINUED | OUTPATIENT
Start: 2017-01-07 | End: 2017-01-10 | Stop reason: HOSPADM

## 2017-01-07 RX ADMIN — QUETIAPINE FUMARATE 12.5 MG: 25 TABLET ORAL at 09:01

## 2017-01-07 RX ADMIN — STANDARDIZED SENNA CONCENTRATE AND DOCUSATE SODIUM 1 TABLET: 8.6; 5 TABLET, FILM COATED ORAL at 09:01

## 2017-01-07 RX ADMIN — CETIRIZINE HYDROCHLORIDE 5 MG: 5 TABLET, FILM COATED ORAL at 08:01

## 2017-01-07 RX ADMIN — RAMELTEON 8 MG: 8 TABLET, FILM COATED ORAL at 10:01

## 2017-01-07 RX ADMIN — STANDARDIZED SENNA CONCENTRATE AND DOCUSATE SODIUM 1 TABLET: 8.6; 5 TABLET, FILM COATED ORAL at 08:01

## 2017-01-07 RX ADMIN — ASPIRIN 81 MG: 81 TABLET, COATED ORAL at 08:01

## 2017-01-07 RX ADMIN — BUSPIRONE HYDROCHLORIDE 5 MG: 5 TABLET ORAL at 08:01

## 2017-01-07 RX ADMIN — MIRTAZAPINE 30 MG: 30 TABLET, FILM COATED ORAL at 09:01

## 2017-01-07 RX ADMIN — OXYCODONE HYDROCHLORIDE 5 MG: 5 TABLET ORAL at 08:01

## 2017-01-07 RX ADMIN — PANTOPRAZOLE SODIUM 40 MG: 40 TABLET, DELAYED RELEASE ORAL at 08:01

## 2017-01-07 RX ADMIN — QUETIAPINE FUMARATE 12.5 MG: 25 TABLET ORAL at 12:01

## 2017-01-07 RX ADMIN — FUROSEMIDE 40 MG: 40 TABLET ORAL at 08:01

## 2017-01-07 RX ADMIN — HYDROCODONE BITARTRATE AND ACETAMINOPHEN 1 TABLET: 10; 325 TABLET ORAL at 05:01

## 2017-01-07 RX ADMIN — LIDOCAINE 1 PATCH: 50 PATCH TOPICAL at 12:01

## 2017-01-07 RX ADMIN — Medication 5 UNITS: at 12:01

## 2017-01-07 RX ADMIN — ALPRAZOLAM 0.25 MG: 0.25 TABLET ORAL at 05:01

## 2017-01-07 RX ADMIN — OXYCODONE HYDROCHLORIDE 5 MG: 5 TABLET ORAL at 04:01

## 2017-01-07 RX ADMIN — ALPRAZOLAM 0.25 MG: 0.25 TABLET ORAL at 09:01

## 2017-01-07 NOTE — ASSESSMENT & PLAN NOTE
- Continue Remeron 30mg qHS, Xanax 0.25mg BID PRN.  - Daughter notes patient tends to sundown.  - Given Zyprexa 25 mg x1 for increased agitation.  - Will decrease oxy 5 to hydrocodone. Will add Seroquel 12.5 mg BID    DELIRIUM BEHAVIOR MANAGEMENT  - Minimize use of restraints; if physical restraints necessary, please utilize medical/chemical prns for agitation.  - Keep shades open and room lit during day and room dim at night in order to promote healthy circadian rhythms.  - Encourage family at bedside  - Keep whiteboard in patient's room current with the date and name of the members of patient's team for easy patient self re-orientation.  - Avoid benzodiazepines, antihistamines, anticholinergics, hypnotics, and minimize opiates while controlling for pain as these medications may exacerbate delirium.

## 2017-01-07 NOTE — PLAN OF CARE
Problem: Patient Care Overview  Goal: Plan of Care Review  Outcome: Ongoing (interventions implemented as appropriate)  No acute events overnight.  Pt's diet was advanced to pureed with honey thick liquids with orders to alternate bites and sips.  All meds crushed and given in applesauce.  Pt resting most of the shift.  Tb skin test administered to right forearm.  No falls or injuries to occur.  Avys camera at bedside for safety.  Bed alarm on.  Will continue to monitor.

## 2017-01-07 NOTE — PLAN OF CARE
Problem: Fall Risk (Adult)  Intervention: Patient Rounds    01/07/17 1715   Safety Interventions   Patient Rounds bed in low position;bed wheels locked;clutter free environment maintained;ID band on;placement of personal items at bedside;toileting offered;visualized patient;call light in reach       Intervention: Safety Promotion/Fall Prevention    01/07/17 1715   Safety Interventions   Safety Promotion/Fall Prevention assistive device/personal item within reach;bed alarm set;/camera at bedside;diversional activities provided;Fall Risk reviewed with patient/family;nonskid shoes/slippers when out of bed;muscle strengthening facilitated;side rails raised x 3           Problem: Patient Care Overview  Goal: Plan of Care Review    01/07/17 1715   Coping/Psychosocial   Plan Of Care Reviewed With Patient: Safety: call light in reach, patient oriented to room & instructed how to notify nurse if assistance is needed, current questions/concerns addressed, bed in lowest position with wheels locked & side rails up X 3. Pt and family were educated regarding fall precaution and taking appropriate action. Pt is to be turned and propped Activity: bedrest, when oob requires thoracic brace to be worn  Neurological: oriented to self Respiratory: d/c NC, ON RA, O2 WNL.  Cardiac: BP stable. HR stable. Afebrile this shift. Intake/Output: pt is total feed, inadequate food intake, family was educated and pt was encourage to eat when feeding the patient. Incont. bowel and bladder. Pt has not have bm since the 4th of this month. Pt is on stool softer.  Pain: Controlled with prn medication Skin: bruised, scan tear on LFA and some break down noted on sacrum due to moist association. Devices: Pacemaker LCW.          Problem: Pressure Ulcer Risk (Harris Scale) (Adult,Obstetrics,Pediatric)  Intervention: Turn/Reposition Often    01/07/17 1715   Positioning   Body Position foot of bed elevated;supine, head elevated;supine, legs  elevated; pt has been repositioned.   Skin Interventions   Pressure Reduction Techniques frequent weight shift encouraged

## 2017-01-07 NOTE — PLAN OF CARE
Problem: Patient Care Overview  Goal: Plan of Care Review  Outcome: Ongoing (interventions implemented as appropriate)  Pt disoriented x4; VSS; bed alarm set; Avys camera at bedside no injuries noted; pt NPO pending swallow study; will continue to monitor

## 2017-01-07 NOTE — PROGRESS NOTES
[facility] Hospital Medicine  Progress Note    Patient Name: Melina Boss  MRN: [id number]  Patient Class: OP- Observation   Admission Date: 1/4/2017  Length of Stay: 0 days  Expected Discharge Date: 1/9/2017  Attending Physician: Jaspreet Estrada MD  Primary Care Provider: Elvira Em MD    [facility] Medicine Team: [facility] E Russ Zuniga PA-C    Subjective:     Principal Problem:Compression of lumbar vertebra    HPI:  Patient is a 89 year old lady with a h/o osteoarthritis, dementia, bradycardia s/p PPM, GERD, OA, and vitamin D deficiency.  She was transferred from [facility] for neurosurgical evaluation of L2 compression fracture.  Daughter at bedside provides most of history.  She reports a sudden change in patient's posture on 1/1/2017 when patient was shifted from her car to her wheelchair by her caregiver.  Since that time, she has complained of LBP.  Today, however, she was unable to perform her ADLs.  CT spine revealed L2 compression fracture with extension into the left pedicle.  She continues to have back pain, but states it is better with pain meds.  She was in her usual state of health prior to this event.  Denies loss of bowel/bladder control.  Denies chest pain, SOB, N/V, fever/chills.  Patient is very hard of hearing and is able to hear best out of L ear.  She uses a wheelchair at baseline.    Hospital Course:  89 yo female admitted to observation for further evaluation and treatment of compression of lumbar vertebra. Neurosurgery consulted, family does not wish to pursue surgical options at this time, LSO brace, repeat XR in brace (supine and sitting) pending. Continue supportive care and pain control. PT/OT consulted, recommending NH SNF. CM/SW assisting in placement. Patient had prior placement in NH facility in [place], but they are unable to accommodate patients needs at this time. Will continue pain control until placement. SLP consulted, barium swallow performed,  recommendations added to patient diet to minimize risk of aspiration. Patient started on seroquel BID to help with sundowning.    Interval History: Started patient on Seroquel 12.5 mg BID, changed oxy to hydrocodone and have oxy 5 for significant pain only. Hopefully this will help with increased sundowning. Discussed case with SW today, since patient has primary insurance of medicare she will need inpatient status prior to approval for SNF, after chart review CM did not feel that she qualified for inpatient status and therefore does not qualify for SNF. Discussed with family, they will need to pursue intermediate care nursing home or home with 24 hour care (which they had previously). I explained that we can send home with home health as well. WCTM patient as we started her on seroquel tonight.    Review of Systems   Unable to perform ROS: Dementia     Objective:     Vital Signs (Most Recent):  Temp: 98.1 °F (36.7 °C) (01/07/17 0803)  Pulse: 69 (01/07/17 0803)  Resp: 18 (01/07/17 0803)  BP: (!) 141/87 (01/07/17 0803)  SpO2: 96 % (01/07/17 1600) Vital Signs (24h Range):  Temp:  [96.9 °F (36.1 °C)-98.1 °F (36.7 °C)] 98.1 °F (36.7 °C)  Pulse:  [69-77] 69  Resp:  [18-20] 18  BP: (132-155)/(64-87) 141/87     Weight:  (bed scale doesn't work)  Body mass index is 17.75 kg/(m^2).    Intake/Output Summary (Last 24 hours) at 01/07/17 1750  Last data filed at 01/07/17 1700   Gross per 24 hour   Intake              400 ml   Output                1 ml   Net              399 ml      Physical Exam   Constitutional: She appears well-developed and well-nourished. No distress.   HENT:   Head: Normocephalic and atraumatic.   Neck: Carotid bruit is not present.   Cardiovascular: Normal rate and regular rhythm.  Exam reveals no gallop.    No murmur heard.  Pulmonary/Chest: Effort normal and breath sounds normal. No respiratory distress. She has no wheezes.   Abdominal: Soft. Bowel sounds are normal. She exhibits no distension. There is no  splenomegaly or hepatomegaly. There is no tenderness.   Musculoskeletal: Normal range of motion. She exhibits tenderness (Lumbar spine). She exhibits no edema or deformity.   Neurological: She is alert. No cranial nerve deficit or sensory deficit.   Oriented to person, disoriented to place, time. Episodes of increased confusion intermittently throughout day.        Significant Labs: All pertinent labs within the past 24 hours have been reviewed.    Significant Imaging: I have reviewed all pertinent imaging results/findings within the past 24 hours.    Assessment/Plan:      * Compression of lumbar vertebra  - Appreciate neurosurgery's assistance.  Family does not wish to pursue surgical options.  Patient is nonambulatory at baseline.  LSO brace.  - Sitting and supine x-rays in brace with L2 compression fracture with no change in alignment of the lumbar spine on the sitting and upright views. Neurosurgery signed off, will arrange 8 week follow up.   - Supportive care. Continue oral analgesics PRN. Patient with intermittent episodes of increased confusion.   - PT/OT consults recommending NH SNF. CM/SW assisting with placement, patient was to be discharged to facility in [place] today but family visited to sign paperwork and report facility is unable to accommodate patients needs and they would like to pursue alternative placement. Family reports they are unable to care for patient at home.  - Main insurance of medicare, will not qualify for SNF unfortunately without inpatient status. SW reviewed chart and patient does not qualify for inpatient status. Family and patient will need to pursue [facility] NH care at this time. Discussed with family.    Dementia with behavioral disturbance  - Continue Remeron 30mg qHS, Xanax 0.25mg BID PRN.  - Daughter notes patient tends to sundown.  - Given Zyprexa 25 mg x1 for increased agitation.  - Will decrease oxy 5 to hydrocodone. Will add Seroquel 12.5 mg BID    [facility]  MANAGEMENT  - Minimize use of restraints; if physical restraints necessary, please utilize medical/chemical prns for agitation.  - Keep shades open and room lit during day and room dim at night in order to promote healthy circadian rhythms.  - Encourage family at bedside  - Keep whiteboard in patient's room current with the date and name of the members of patient's team for easy patient self re-orientation.  - Avoid benzodiazepines, antihistamines, anticholinergics, hypnotics, and minimize opiates while controlling for pain as these medications may exacerbate delirium.    Vitamin D deficiency  - Vitamin D 25, will start daily replacement    Cardiac pacemaker  - Placed secondary to bradycardia.  Stable.    VTE Risk Mitigation         Ordered     Medium Risk of VTE  Once      01/04/17 2357     Place SAUL hose  Until discontinued      01/04/17 2357     Place sequential compression device  Until discontinued      01/04/17 2357          Russ Zuniga PA-C  Department of Hospital Medicine   [facility]

## 2017-01-07 NOTE — ASSESSMENT & PLAN NOTE
- Appreciate neurosurgery's assistance.  Family does not wish to pursue surgical options.  Patient is nonambulatory at baseline.  LSO brace.  - Sitting and supine x-rays in brace with L2 compression fracture with no change in alignment of the lumbar spine on the sitting and upright views. Neurosurgery signed off, will arrange 8 week follow up.   - Supportive care. Continue oral analgesics PRN. Patient with intermittent episodes of increased confusion.   - PT/OT consults recommending NH SNF. CM/SW assisting with placement, patient was to be discharged to facility in [place] today but family visited to sign paperwork and report facility is unable to accommodate patients needs and they would like to pursue alternative placement. Family reports they are unable to care for patient at home.  - Main insurance of medicare, will not qualify for SNF unfortunately without inpatient status. SW reviewed chart and patient does not qualify for inpatient status. Family and patient will need to pursue skilled nursing NH care at this time. Discussed with family.

## 2017-01-07 NOTE — SUBJECTIVE & OBJECTIVE
Interval History: Started patient on Seroquel 12.5 mg BID, changed oxy to hydrocodone and have oxy 5 for significant pain only. Hopefully this will help with increased sundowning. Discussed case with SW today, since patient has primary insurance of medicare she will need inpatient status prior to approval for SNF, after chart review CM did not feel that she qualified for inpatient status and therefore does not qualify for SNF. Discussed with family, they will need to pursue long-term care nursing home or home with 24 hour care (which they had previously). I explained that we can send home with home health as well. WCTM patient as we started her on seroquel tonight.    Review of Systems   Unable to perform ROS: Dementia     Objective:     Vital Signs (Most Recent):  Temp: 98.1 °F (36.7 °C) (01/07/17 0803)  Pulse: 69 (01/07/17 0803)  Resp: 18 (01/07/17 0803)  BP: (!) 141/87 (01/07/17 0803)  SpO2: 96 % (01/07/17 1600) Vital Signs (24h Range):  Temp:  [96.9 °F (36.1 °C)-98.1 °F (36.7 °C)] 98.1 °F (36.7 °C)  Pulse:  [69-77] 69  Resp:  [18-20] 18  BP: (132-155)/(64-87) 141/87     Weight:  (bed scale doesn't work)  Body mass index is 17.75 kg/(m^2).    Intake/Output Summary (Last 24 hours) at 01/07/17 1750  Last data filed at 01/07/17 1700   Gross per 24 hour   Intake              400 ml   Output                1 ml   Net              399 ml      Physical Exam   Constitutional: She appears well-developed and well-nourished. No distress.   HENT:   Head: Normocephalic and atraumatic.   Neck: Carotid bruit is not present.   Cardiovascular: Normal rate and regular rhythm.  Exam reveals no gallop.    No murmur heard.  Pulmonary/Chest: Effort normal and breath sounds normal. No respiratory distress. She has no wheezes.   Abdominal: Soft. Bowel sounds are normal. She exhibits no distension. There is no splenomegaly or hepatomegaly. There is no tenderness.   Musculoskeletal: Normal range of motion. She exhibits tenderness (Lumbar  spine). She exhibits no edema or deformity.   Neurological: She is alert. No cranial nerve deficit or sensory deficit.   Oriented to person, disoriented to place, time. Episodes of increased confusion intermittently throughout day.        Significant Labs: All pertinent labs within the past 24 hours have been reviewed.    Significant Imaging: I have reviewed all pertinent imaging results/findings within the past 24 hours.

## 2017-01-08 PROBLEM — R62.7 FAILURE TO THRIVE IN ADULT: Status: ACTIVE | Noted: 2017-01-08

## 2017-01-08 LAB
ANION GAP SERPL CALC-SCNC: 6 MMOL/L
BUN SERPL-MCNC: 26 MG/DL
CALCIUM SERPL-MCNC: 8.1 MG/DL
CHLORIDE SERPL-SCNC: 106 MMOL/L
CO2 SERPL-SCNC: 32 MMOL/L
CREAT SERPL-MCNC: 0.8 MG/DL
ERYTHROCYTE [DISTWIDTH] IN BLOOD BY AUTOMATED COUNT: 16.3 %
EST. GFR  (AFRICAN AMERICAN): >60 ML/MIN/1.73 M^2
EST. GFR  (NON AFRICAN AMERICAN): >60 ML/MIN/1.73 M^2
GLUCOSE SERPL-MCNC: 99 MG/DL
HCT VFR BLD AUTO: 36.6 %
HGB BLD-MCNC: 11.3 G/DL
MCH RBC QN AUTO: 26.8 PG
MCHC RBC AUTO-ENTMCNC: 30.9 %
MCV RBC AUTO: 87 FL
PLATELET # BLD AUTO: 165 K/UL
PMV BLD AUTO: 9.9 FL
POTASSIUM SERPL-SCNC: 3.7 MMOL/L
RBC # BLD AUTO: 4.21 M/UL
SODIUM SERPL-SCNC: 144 MMOL/L
WBC # BLD AUTO: 7.5 K/UL

## 2017-01-08 PROCEDURE — 99225 PR SUBSEQUENT OBSERVATION CARE,LEVEL II: CPT | Mod: ,,, | Performed by: PHYSICIAN ASSISTANT

## 2017-01-08 PROCEDURE — 85027 COMPLETE CBC AUTOMATED: CPT

## 2017-01-08 PROCEDURE — 80048 BASIC METABOLIC PNL TOTAL CA: CPT

## 2017-01-08 PROCEDURE — 25000003 PHARM REV CODE 250: Performed by: PHYSICIAN ASSISTANT

## 2017-01-08 PROCEDURE — 63600175 PHARM REV CODE 636 W HCPCS: Performed by: PHYSICIAN ASSISTANT

## 2017-01-08 PROCEDURE — 36415 COLL VENOUS BLD VENIPUNCTURE: CPT

## 2017-01-08 PROCEDURE — G0378 HOSPITAL OBSERVATION PER HR: HCPCS

## 2017-01-08 RX ORDER — QUETIAPINE FUMARATE 25 MG/1
25 TABLET, FILM COATED ORAL 2 TIMES DAILY
Status: DISCONTINUED | OUTPATIENT
Start: 2017-01-08 | End: 2017-01-10 | Stop reason: HOSPADM

## 2017-01-08 RX ORDER — HYDROCODONE BITARTRATE AND ACETAMINOPHEN 5; 325 MG/1; MG/1
1 TABLET ORAL EVERY 6 HOURS PRN
Status: DISCONTINUED | OUTPATIENT
Start: 2017-01-08 | End: 2017-01-10 | Stop reason: HOSPADM

## 2017-01-08 RX ORDER — ACETAMINOPHEN 325 MG/1
650 TABLET ORAL EVERY 6 HOURS PRN
Status: DISCONTINUED | OUTPATIENT
Start: 2017-01-08 | End: 2017-01-10 | Stop reason: HOSPADM

## 2017-01-08 RX ORDER — NAPROXEN 250 MG/1
250 TABLET ORAL 2 TIMES DAILY WITH MEALS
Status: DISCONTINUED | OUTPATIENT
Start: 2017-01-08 | End: 2017-01-10 | Stop reason: HOSPADM

## 2017-01-08 RX ORDER — SODIUM CHLORIDE 9 MG/ML
INJECTION, SOLUTION INTRAVENOUS CONTINUOUS
Status: ACTIVE | OUTPATIENT
Start: 2017-01-08 | End: 2017-01-09

## 2017-01-08 RX ORDER — HYDROCODONE BITARTRATE AND ACETAMINOPHEN 5; 325 MG/1; MG/1
1 TABLET ORAL EVERY 6 HOURS PRN
Status: DISCONTINUED | OUTPATIENT
Start: 2017-01-08 | End: 2017-01-08

## 2017-01-08 RX ORDER — ENOXAPARIN SODIUM 100 MG/ML
40 INJECTION SUBCUTANEOUS EVERY 24 HOURS
Status: DISCONTINUED | OUTPATIENT
Start: 2017-01-08 | End: 2017-01-10 | Stop reason: HOSPADM

## 2017-01-08 RX ADMIN — ENOXAPARIN SODIUM 40 MG: 100 INJECTION SUBCUTANEOUS at 02:01

## 2017-01-08 RX ADMIN — QUETIAPINE FUMARATE 25 MG: 25 TABLET, FILM COATED ORAL at 10:01

## 2017-01-08 RX ADMIN — RAMELTEON 8 MG: 8 TABLET, FILM COATED ORAL at 10:01

## 2017-01-08 RX ADMIN — ALPRAZOLAM 0.25 MG: 0.25 TABLET ORAL at 06:01

## 2017-01-08 RX ADMIN — HYDROCODONE BITARTRATE AND ACETAMINOPHEN 1 TABLET: 10; 325 TABLET ORAL at 09:01

## 2017-01-08 RX ADMIN — STANDARDIZED SENNA CONCENTRATE AND DOCUSATE SODIUM 1 TABLET: 8.6; 5 TABLET, FILM COATED ORAL at 10:01

## 2017-01-08 RX ADMIN — ASPIRIN 81 MG: 81 TABLET, COATED ORAL at 09:01

## 2017-01-08 RX ADMIN — FUROSEMIDE 40 MG: 40 TABLET ORAL at 09:01

## 2017-01-08 RX ADMIN — PANTOPRAZOLE SODIUM 40 MG: 40 TABLET, DELAYED RELEASE ORAL at 09:01

## 2017-01-08 RX ADMIN — STANDARDIZED SENNA CONCENTRATE AND DOCUSATE SODIUM 1 TABLET: 8.6; 5 TABLET, FILM COATED ORAL at 09:01

## 2017-01-08 RX ADMIN — LIDOCAINE 1 PATCH: 50 PATCH TOPICAL at 02:01

## 2017-01-08 RX ADMIN — SODIUM CHLORIDE: 0.9 INJECTION, SOLUTION INTRAVENOUS at 02:01

## 2017-01-08 RX ADMIN — MIRTAZAPINE 30 MG: 30 TABLET, FILM COATED ORAL at 10:01

## 2017-01-08 RX ADMIN — QUETIAPINE FUMARATE 25 MG: 25 TABLET, FILM COATED ORAL at 09:01

## 2017-01-08 RX ADMIN — HYDROCODONE BITARTRATE AND ACETAMINOPHEN 1 TABLET: 5; 325 TABLET ORAL at 05:01

## 2017-01-08 RX ADMIN — NAPROXEN 250 MG: 250 TABLET ORAL at 05:01

## 2017-01-08 NOTE — ASSESSMENT & PLAN NOTE
- Continue Remeron 30mg qHS, Xanax 0.25mg BID PRN.  - Daughter notes patient tends to sundown.  - Scheduled Seroquel 25 mg BID, decrease opiate pain medications as above    [facility]HAVIOR MANAGEMENT  - Minimize use of restraints; if physical restraints necessary, please utilize medical/chemical prns for agitation.  - Keep shades open and room lit during day and room dim at night in order to promote healthy circadian rhythms.  - Encourage family at bedside  - Keep whiteboard in patient's room current with the date and name of the members of patient's team for easy patient self re-orientation.  - Avoid benzodiazepines, antihistamines, anticholinergics, hypnotics, and minimize opiates while controlling for pain as these medications may exacerbate delirium.

## 2017-01-08 NOTE — ASSESSMENT & PLAN NOTE
- Family to place patient in [facility] nursing home care, will look at options starting Monday, unable to care for patient at home at this time  - Nursing to document % food eaten. will give gentle IVFs as patient with decreased PO intake.

## 2017-01-08 NOTE — ASSESSMENT & PLAN NOTE
- Appreciate neurosurgery's assistance.  Family does not wish to pursue surgical options.  Patient is nonambulatory at baseline.  LSO brace when up and OOB.  - Sitting and supine x-rays in brace with L2 compression fracture with no change in alignment of the lumbar spine on the sitting and upright views. Neurosurgery signed off, will arrange 8 week follow up.   - Supportive care. Continue oral analgesics PRN. Patient with intermittent episodes of increased confusion.   - Pain control regimen: Scheduled naproxen 250 mg BID WM, PRN tylenol, norco 5-325  - PT/OT consults recommending NH SNF. CM/SW assisting with placement, patient was to be discharged to facility in [place] today but family visited to sign paperwork and report facility is unable to accommodate patients needs (they will not administer medications at night) and they would like to pursue alternative placement. Family reports they are unable to care for patient at home.  - Main insurance of medicare, will not qualify for SNF unfortunately without inpatient status. SW reviewed chart and patient does not qualify for inpatient status. Family and patient will need to pursue [facility] NH care at this time. Discussed with family.

## 2017-01-08 NOTE — NURSING
Patient's daughter and caregiver at bedside.  KELLEN Zuniga notified and coming to bedside.  Family very overwhelmed at the thought of transporting patient on their own with a spinal fracture.  Have lots of placement and social work questions.  Were hoping for a special wheelchair to help her sit upright upon discharge. Will continue to monitor.

## 2017-01-08 NOTE — PLAN OF CARE
Problem: Patient Care Overview  Goal: Plan of Care Review  Outcome: Ongoing (interventions implemented as appropriate)  No acute events overnight. Patient remains AAO x1. She became agitated overnight, pulling at clothes and attempting to exit the bed. Patient reoriented constantly. PRN Rozerem given and a sitter placed at bedside temporarily. Avys camera in place. Patient sleep at this time. Will continue to monitor patient.

## 2017-01-08 NOTE — SUBJECTIVE & OBJECTIVE
Interval History: Patient with continued agitation overnight. Increased Seroquel to 25 mg BID. Patient resting s/p seroquel. Decreased Norco from  to 5-325 and only for severe pain. Will add scheduled naproxen BID WM to help keep on top of pain. Suspect increased agitation likely 2/2 pain, pain medications, new environment, underlying dementia. Patient did not require PRN oxy overnight for pain, will d/c today. Plan to to continue decreasing opiate pain medication to non-opiate options. Increase seroquel to help with agitation. Family to discuss residential placement tomorrow with [facility]. Daughter in agreement regarding removing dietary modifications, discussed possibility of aspiration, PNA, they understand risks but would like for patient to have increased PO intake. Patient does not like thickeners. Discussed plan with family and they are amenable.    Review of Systems   Unable to perform ROS: Dementia     Objective:     Vital Signs (Most Recent):  Temp: 98 °F (36.7 °C) (01/08/17 0928)  Pulse: 82 (01/08/17 0928)  Resp: 18 (01/08/17 0928)  BP: (!) 142/74 (01/08/17 0928)  SpO2: (!) 93 % (01/08/17 0928) Vital Signs (24h Range):  Temp:  [98 °F (36.7 °C)-98.1 °F (36.7 °C)] 98 °F (36.7 °C)  Pulse:  [64-82] 82  Resp:  [18] 18  BP: (138-142)/(74-81) 142/74     Weight:  (bed scale doesn't work)  Body mass index is 17.75 kg/(m^2).    Intake/Output Summary (Last 24 hours) at 01/08/17 1152  Last data filed at 01/08/17 0600   Gross per 24 hour   Intake              390 ml   Output                1 ml   Net              389 ml      Physical Exam   Constitutional: She appears well-developed and well-nourished. She appears lethargic. No distress.   HENT:   Head: Normocephalic and atraumatic.   Mouth/Throat: Mucous membranes are dry.   Cardiovascular: Normal rate and regular rhythm.  Exam reveals no gallop.    No murmur heard.  Pulmonary/Chest: Effort normal and breath sounds normal. No respiratory distress. She has  no wheezes.   Abdominal: Soft. Bowel sounds are normal. She exhibits no distension. There is no splenomegaly or hepatomegaly. There is no tenderness.   Musculoskeletal: Normal range of motion. She exhibits tenderness (Lumbar spine). She exhibits no edema or deformity.   Neurological: She appears lethargic. She is disoriented. No cranial nerve deficit or sensory deficit.   Oriented to person, disoriented to place, time. Episodes of increased confusion intermittently throughout day per family.    Psychiatric: Cognition and memory are impaired.   Nursing note and vitals reviewed.      Significant Labs:   CBC:   Recent Labs  Lab 01/07/17 0419 01/08/17 0413   WBC 6.63 7.50   HGB 12.0 11.3*   HCT 40.0 36.6*    165     CMP:   Recent Labs  Lab 01/07/17 0419 01/08/17 0413   * 144   K 3.9 3.7    106   CO2 33* 32*   GLU 95 99   BUN 26 26   CREATININE 0.8 0.8   CALCIUM 8.0* 8.1*   ANIONGAP 7* 6*   EGFRNONAA >60.0 >60.0     All pertinent labs within the past 24 hours have been reviewed.    Significant Imaging: I have reviewed all pertinent imaging results/findings within the past 24 hours.

## 2017-01-08 NOTE — PROGRESS NOTES
[facility] Hospital Medicine  Progress Note    Patient Name: Melina Boss  MRN: [id number]  Patient Class: OP- Observation   Admission Date: 1/4/2017  Length of Stay: 0 days  Expected Discharge Date: 1/9/2017  Attending Physician: Jaspreet Estrada MD  Primary Care Provider: Elvira Em MD    [facility] Medicine Team: [facility] E Russ Zuniga PA-C    Subjective:     Principal Problem:Compression of lumbar vertebra    HPI:  Patient is a 89 year old lady with a h/o osteoarthritis, dementia, bradycardia s/p PPM, GERD, OA, and vitamin D deficiency.  She was transferred from [facility] for neurosurgical evaluation of L2 compression fracture.  Daughter at bedside provides most of history.  She reports a sudden change in patient's posture on 1/1/2017 when patient was shifted from her car to her wheelchair by her caregiver.  Since that time, she has complained of LBP.  Today, however, she was unable to perform her ADLs.  CT spine revealed L2 compression fracture with extension into the left pedicle.  She continues to have back pain, but states it is better with pain meds.  She was in her usual state of health prior to this event.  Denies loss of bowel/bladder control.  Denies chest pain, SOB, N/V, fever/chills.  Patient is very hard of hearing and is able to hear best out of L ear.  She uses a wheelchair at baseline.    Hospital Course:  89 yo female admitted to observation for further evaluation and treatment of compression of lumbar vertebra. Neurosurgery consulted, family does not wish to pursue surgical options at this time, LSO brace, repeat XR in brace (supine and sitting) pending. Continue supportive care and pain control. PT/OT consulted, recommending NH SNF. CM/SW assisting in placement. Patient had prior placement in NH facility in [place], but they are unable to accommodate patients needs at this time. Will continue pain control until placement. SLP consulted, barium swallow performed,  recommendations added to patient diet to minimize risk of aspiration. Patient's family requesting dietary modifications be removed so that patient may have increased PO intake as she does not like thickener or pureed food. Patient started on seroquel BID to help with agitation and decreased pain regimen.    Interval History: Patient with continued agitation overnight. Increased Seroquel to 25 mg BID. Patient resting s/p seroquel. Decreased Norco from  to 5-325 and only for severe pain. Will add scheduled naproxen BID WM to help keep on top of pain. Suspect increased agitation likely 2/2 pain, pain medications, new environment, underlying dementia. Patient did not require PRN oxy overnight for pain, will d/c today. Plan to to continue decreasing opiate pain medication to non-opiate options. Increase seroquel to help with agitation. Family to discuss prison placement tomorrow with Herita Atkinson. Daughter in agreement regarding removing dietary modifications, discussed possibility of aspiration, PNA, they understand risks but would like for patient to have increased PO intake. Patient does not like thickeners. Discussed plan with family and they are amenable.    Review of Systems   Unable to perform ROS: Dementia     Objective:     Vital Signs (Most Recent):  Temp: 98 °F (36.7 °C) (01/08/17 0928)  Pulse: 82 (01/08/17 0928)  Resp: 18 (01/08/17 0928)  BP: (!) 142/74 (01/08/17 0928)  SpO2: (!) 93 % (01/08/17 0928) Vital Signs (24h Range):  Temp:  [98 °F (36.7 °C)-98.1 °F (36.7 °C)] 98 °F (36.7 °C)  Pulse:  [64-82] 82  Resp:  [18] 18  BP: (138-142)/(74-81) 142/74     Weight:  (bed scale doesn't work)  Body mass index is 17.75 kg/(m^2).    Intake/Output Summary (Last 24 hours) at 01/08/17 1152  Last data filed at 01/08/17 0600   Gross per 24 hour   Intake              390 ml   Output                1 ml   Net              389 ml      Physical Exam   Constitutional: She appears well-developed and well-nourished.  She appears lethargic. No distress.   HENT:   Head: Normocephalic and atraumatic.   Mouth/Throat: Mucous membranes are dry.   Cardiovascular: Normal rate and regular rhythm.  Exam reveals no gallop.    No murmur heard.  Pulmonary/Chest: Effort normal and breath sounds normal. No respiratory distress. She has no wheezes.   Abdominal: Soft. Bowel sounds are normal. She exhibits no distension. There is no splenomegaly or hepatomegaly. There is no tenderness.   Musculoskeletal: Normal range of motion. She exhibits tenderness (Lumbar spine). She exhibits no edema or deformity.   Neurological: She appears lethargic. She is disoriented. No cranial nerve deficit or sensory deficit.   Oriented to person, disoriented to place, time. Episodes of increased confusion intermittently throughout day per family.    Psychiatric: Cognition and memory are impaired.   Nursing note and vitals reviewed.      Significant Labs:   CBC:   Recent Labs  Lab 01/07/17 0419 01/08/17 0413   WBC 6.63 7.50   HGB 12.0 11.3*   HCT 40.0 36.6*    165     CMP:   Recent Labs  Lab 01/07/17 0419 01/08/17 0413   * 144   K 3.9 3.7    106   CO2 33* 32*   GLU 95 99   BUN 26 26   CREATININE 0.8 0.8   CALCIUM 8.0* 8.1*   ANIONGAP 7* 6*   EGFRNONAA >60.0 >60.0     All pertinent labs within the past 24 hours have been reviewed.    Significant Imaging: I have reviewed all pertinent imaging results/findings within the past 24 hours.    Assessment/Plan:      * Compression of lumbar vertebra  - Appreciate neurosurgery's assistance.  Family does not wish to pursue surgical options.  Patient is nonambulatory at baseline.  LSO brace when up and OOB.  - Sitting and supine x-rays in brace with L2 compression fracture with no change in alignment of the lumbar spine on the sitting and upright views. Neurosurgery signed off, will arrange 8 week follow up.   - Supportive care. Continue oral analgesics PRN. Patient with intermittent episodes of increased  confusion.   - Pain control regimen: Scheduled naproxen 250 mg BID WM, PRN tylenol, norco 5-325  - PT/OT consults recommending NH SNF. CM/SW assisting with placement, patient was to be discharged to facility in [place] today but family visited to sign paperwork and report facility is unable to accommodate patients needs (they will not administer medications at night) and they would like to pursue alternative placement. Family reports they are unable to care for patient at home.  - Main insurance of medicare, will not qualify for SNF unfortunately without inpatient status. SW reviewed chart and patient does not qualify for inpatient status. Family and patient will need to pursue senior care NH care at this time. Discussed with family.    Dementia with behavioral disturbance  - Continue Remeron 30mg qHS, Xanax 0.25mg BID PRN.  - Daughter notes patient tends to sundown.  - Scheduled Seroquel 25 mg BID, decrease opiate pain medications as above    DELIRIUM BEHAVIOR MANAGEMENT  - Minimize use of restraints; if physical restraints necessary, please utilize medical/chemical prns for agitation.  - Keep shades open and room lit during day and room dim at night in order to promote healthy circadian rhythms.  - Encourage family at bedside  - Keep whiteboard in patient's room current with the date and name of the members of patient's team for easy patient self re-orientation.  - Avoid benzodiazepines, antihistamines, anticholinergics, hypnotics, and minimize opiates while controlling for pain as these medications may exacerbate delirium.    Vitamin D deficiency  - Vitamin D 25, will start daily replacement    Cardiac pacemaker  - Placed secondary to bradycardia.  Stable.    Failure to thrive in adult  - Family to place patient in senior care nursing home care, will look at options starting Monday, unable to care for patient at home at this time  - Nursing to document % food eaten. will give gentle IVFs as patient with decreased PO  intake.     VTE Risk Mitigation         Ordered     enoxaparin injection 40 mg  Daily     Route:  Subcutaneous        01/08/17 1150     Medium Risk of VTE  Once      01/04/17 2357     Place SAUL hose  Until discontinued      01/04/17 2357     Place sequential compression device  Until discontinued      01/04/17 2357          Russ Zuniga PA-C  Department of Hospital Medicine   [facility]

## 2017-01-09 ENCOUNTER — TELEPHONE (OUTPATIENT)
Dept: NEUROSURGERY | Facility: CLINIC | Age: 82
End: 2017-01-09

## 2017-01-09 LAB
ANION GAP SERPL CALC-SCNC: 5 MMOL/L
BUN SERPL-MCNC: 22 MG/DL
CALCIUM SERPL-MCNC: 7.8 MG/DL
CHLORIDE SERPL-SCNC: 106 MMOL/L
CO2 SERPL-SCNC: 33 MMOL/L
CREAT SERPL-MCNC: 0.8 MG/DL
ERYTHROCYTE [DISTWIDTH] IN BLOOD BY AUTOMATED COUNT: 16.2 %
EST. GFR  (AFRICAN AMERICAN): >60 ML/MIN/1.73 M^2
EST. GFR  (NON AFRICAN AMERICAN): >60 ML/MIN/1.73 M^2
GLUCOSE SERPL-MCNC: 88 MG/DL
HCT VFR BLD AUTO: 35.6 %
HGB BLD-MCNC: 10.7 G/DL
MCH RBC QN AUTO: 26.8 PG
MCHC RBC AUTO-ENTMCNC: 30.1 %
MCV RBC AUTO: 89 FL
PLATELET # BLD AUTO: 172 K/UL
PMV BLD AUTO: 9.4 FL
POTASSIUM SERPL-SCNC: 3.9 MMOL/L
RBC # BLD AUTO: 3.99 M/UL
SODIUM SERPL-SCNC: 144 MMOL/L
TB INDURATION 48 - 72 HR READ: 0 MM
WBC # BLD AUTO: 6.57 K/UL

## 2017-01-09 PROCEDURE — G8978 MOBILITY CURRENT STATUS: HCPCS | Mod: CL

## 2017-01-09 PROCEDURE — 97535 SELF CARE MNGMENT TRAINING: CPT

## 2017-01-09 PROCEDURE — 36415 COLL VENOUS BLD VENIPUNCTURE: CPT

## 2017-01-09 PROCEDURE — 97110 THERAPEUTIC EXERCISES: CPT

## 2017-01-09 PROCEDURE — 97530 THERAPEUTIC ACTIVITIES: CPT

## 2017-01-09 PROCEDURE — 97530 THERAPEUTIC ACTIVITIES: CPT | Mod: 59

## 2017-01-09 PROCEDURE — 92526 ORAL FUNCTION THERAPY: CPT

## 2017-01-09 PROCEDURE — 25000003 PHARM REV CODE 250: Performed by: PHYSICIAN ASSISTANT

## 2017-01-09 PROCEDURE — G0378 HOSPITAL OBSERVATION PER HR: HCPCS

## 2017-01-09 PROCEDURE — 85027 COMPLETE CBC AUTOMATED: CPT

## 2017-01-09 PROCEDURE — 99225 PR SUBSEQUENT OBSERVATION CARE,LEVEL II: CPT | Mod: ,,, | Performed by: PHYSICIAN ASSISTANT

## 2017-01-09 PROCEDURE — G8979 MOBILITY GOAL STATUS: HCPCS | Mod: CK

## 2017-01-09 PROCEDURE — 80048 BASIC METABOLIC PNL TOTAL CA: CPT

## 2017-01-09 PROCEDURE — G8980 MOBILITY D/C STATUS: HCPCS | Mod: CL

## 2017-01-09 RX ORDER — HYDROCODONE BITARTRATE AND ACETAMINOPHEN 5; 325 MG/1; MG/1
1 TABLET ORAL EVERY 6 HOURS PRN
Qty: 90 TABLET | Refills: 0 | Status: SHIPPED | OUTPATIENT
Start: 2017-01-09 | End: 2017-01-09

## 2017-01-09 RX ORDER — TEMAZEPAM 15 MG/1
15 CAPSULE ORAL NIGHTLY PRN
Qty: 90 CAPSULE | Refills: 0 | Status: SHIPPED | OUTPATIENT
Start: 2017-01-09

## 2017-01-09 RX ORDER — OMEPRAZOLE 20 MG/1
20 CAPSULE, DELAYED RELEASE ORAL DAILY
Qty: 180 CAPSULE | Refills: 3
Start: 2017-01-09

## 2017-01-09 RX ORDER — HYDROCODONE BITARTRATE AND ACETAMINOPHEN 5; 325 MG/1; MG/1
1 TABLET ORAL EVERY 6 HOURS PRN
Qty: 90 TABLET | Refills: 0 | Status: SHIPPED | OUTPATIENT
Start: 2017-01-09

## 2017-01-09 RX ORDER — NAPROXEN 250 MG/1
250 TABLET ORAL 2 TIMES DAILY PRN
Start: 2017-01-09

## 2017-01-09 RX ORDER — TEMAZEPAM 15 MG/1
15 CAPSULE ORAL NIGHTLY PRN
Qty: 90 CAPSULE | Refills: 0 | Status: SHIPPED | OUTPATIENT
Start: 2017-01-09 | End: 2017-01-09

## 2017-01-09 RX ORDER — QUETIAPINE FUMARATE 25 MG/1
25 TABLET, FILM COATED ORAL 2 TIMES DAILY
Start: 2017-01-09 | End: 2018-01-09

## 2017-01-09 RX ADMIN — STANDARDIZED SENNA CONCENTRATE AND DOCUSATE SODIUM 1 TABLET: 8.6; 5 TABLET, FILM COATED ORAL at 08:01

## 2017-01-09 RX ADMIN — ASPIRIN 81 MG: 81 TABLET, COATED ORAL at 09:01

## 2017-01-09 RX ADMIN — NAPROXEN 250 MG: 250 TABLET ORAL at 09:01

## 2017-01-09 RX ADMIN — NAPROXEN 250 MG: 250 TABLET ORAL at 06:01

## 2017-01-09 RX ADMIN — QUETIAPINE FUMARATE 25 MG: 25 TABLET, FILM COATED ORAL at 09:01

## 2017-01-09 RX ADMIN — STANDARDIZED SENNA CONCENTRATE AND DOCUSATE SODIUM 1 TABLET: 8.6; 5 TABLET, FILM COATED ORAL at 09:01

## 2017-01-09 RX ADMIN — ALPRAZOLAM 0.25 MG: 0.25 TABLET ORAL at 09:01

## 2017-01-09 RX ADMIN — RAMELTEON 8 MG: 8 TABLET, FILM COATED ORAL at 08:01

## 2017-01-09 RX ADMIN — PANTOPRAZOLE SODIUM 40 MG: 40 TABLET, DELAYED RELEASE ORAL at 09:01

## 2017-01-09 RX ADMIN — HYDROCODONE BITARTRATE AND ACETAMINOPHEN 1 TABLET: 5; 325 TABLET ORAL at 12:01

## 2017-01-09 RX ADMIN — FUROSEMIDE 40 MG: 40 TABLET ORAL at 09:01

## 2017-01-09 RX ADMIN — LIDOCAINE 1 PATCH: 50 PATCH TOPICAL at 12:01

## 2017-01-09 RX ADMIN — MIRTAZAPINE 30 MG: 30 TABLET, FILM COATED ORAL at 08:01

## 2017-01-09 RX ADMIN — QUETIAPINE FUMARATE 25 MG: 25 TABLET, FILM COATED ORAL at 08:01

## 2017-01-09 NOTE — ASSESSMENT & PLAN NOTE
- Appreciate neurosurgery's assistance.  Family does not wish to pursue surgical options.  Patient is nonambulatory at baseline.  LSO brace when up and OOB.  - Sitting and supine x-rays in brace with L2 compression fracture with no change in alignment of the lumbar spine on the sitting and upright views. Neurosurgery signed off, will arrange 8 week follow up.   - Supportive care. Continue oral analgesics PRN. Patient with intermittent episodes of increased confusion.   - Pain control regimen: Scheduled naproxen 250 mg BID WM, PRN tylenol, norco 5-325, lidoderm  - PT/OT consults recommending NH SNF. CM/ELENO assisting with placement, patient was to be discharged to facility in [place] today but family visited to sign paperwork and report facility is unable to accommodate patients needs (they will not administer medications at night) and they would like to pursue alternative placement. Family reports they are unable to care for patient at home.  - Main insurance of medicare, will not qualify for SNF unfortunately without inpatient status. SW reviewed chart and patient does not qualify for inpatient status. Family and patient will need to pursue alf NH care at this time. Discussed with family. CM/SW assisting with placement

## 2017-01-09 NOTE — ASSESSMENT & PLAN NOTE
- Family to place patient in halfway nursing home care, will look at options starting Monday, unable to care for patient at home at this time  - Nursing to document % food eaten. will give gentle IVFs as patient with decreased PO intake.

## 2017-01-09 NOTE — PLAN OF CARE
REFERRAL SENT TO     [facility] [phone]>Referral received by the facility.    [facility] IS F/U.

## 2017-01-09 NOTE — PT/OT/SLP PROGRESS
"Speech Language Pathology  Treatment/Discharge     Melina Boss   MRN: [id number]   Admitting Diagnosis: Compression of lumbar vertebra    Diet recommendations: Solid Diet Level:  (SLP rec'd puree/honey thick liquids following MBSS on , but family does not wish to continue dietary modifications; diet orders for regular/thins)   Feed only when awake/alert, HOB to 90 degrees, safest means of intake for thin liquids is via teaspoon; Small bites/sips, Alternating bites/sips, 1 bite/sip at a time, Double swallow with each bite/sip, Meds crushed in puree, Avoid talking while eating and Assistance with meals    SLP Treatment Date: 17  Speech Start Time: 1105     Speech Stop Time: 1127     Speech Total (min): 22 min       TREATMENT BILLABLE MINUTES:  Eval Swallow and Oral Function 14 and Seld Care/Home Management Training 8    Has the patient been evaluated by SLP for swallowing? : Yes  Keep patient NPO?: No   General Precautions: Standard, aspiration, fall, hearing impaired          Subjective:  "milk." pt stated she would like more milk to drink.    "She hasn't had pneumonia and she's had this for 3 years." pt's daughter stated regarding pt's h/o aspiration.    Pain Ratin/10                   Objective:      Per records, nurse, and daughter, pp/family do not wish to continue diet modifications as recommended following MBSS on . Daughter has been educated on risk of aspiration and presence of silent aspiration on MBSS and verbalized understanding of risk.   Nurse reported pt had difficulty swallowing pills (pocketed) and needed to have them crushed and buried in pudding. Pt noted to have cup of pudding and half pint of thin milk with straw at bedside.  SLP presented pt with straw sips of milk to assess for tolerance.  Anterior loss and weak coughing present after intake of cyclical straw sips.  Pt given multiple teaspoon sips of thin milk, with presences of anterior loss but without overt s/s of aspiration.  " SLP explained to daughter that pt tolerated thin liquids on MBSS without penetration or aspiration, therefore, teaspoon is the safest method for intake of thin liquids.  Pt continued to display anterior loss with cup sip trial of milk.  Though no overt coughing was present after cup sip trial, pt's breathing pattern did change and pt began to spontaneously vocalize.  SLP suspects pt may have aspiration the cup sip trial.  Pt accepted only a small amount of pudding from tip of spoon. Pt did not wish to accept any further trials of pudding.  Pt's level of alertness diminished and no further trials were offered.  Continue education was provided to daughter regarding results of MBSS, silent aspiration, risks of aspiration due to residuals of all liquid consistencies following the swallow, safest methods for intake of thin liquids and other strategies for minimizing risks of aspiration.  Pt's daughter verbalized good understanding that the safest diet recommendations were for puree and honey thick liquids and that pt remains at risk for aspiration on current diet (regular diet and thin liquids).      SLP to sign off at this time as pt does not appear able to participate in dysphagia therapy given cognitive status and prognosis for improvement in swallowing function being limited. Please re-consult if appropriate.     Assessment:  Melina Boss is a 89 y.o. female with a medical diagnosis of Compression of lumbar vertebra and presents with dysphagia.     Discharge recommendations: Discharge Facility/Level Of Care Needs: intermedicate care facility/nursing home (hospice care)     Goals:   SLP Goals        Problem: SLP Goal    Goal Priority Disciplines Outcome   SLP Goal     SLP    Description:  Speech Language Pathology Goals  Goals expected to be met by 1/6:  1. Pt will participate in Modified Barium Swallow Study to determine safest PO diet. - goal met    Goal expected to be me by 1/13:  1. Pt will tolerate pureed diet  and honey thick liquids without s/s of aspiration.                      Plan:   Patient to be seen Therapy Frequency: 5 x/week   Plan of Care expires: 02/03/17  Plan of Care reviewed with: patient, daughter  SLP Follow-up?: No              NAVIN Simon, CCC-SLP  01/09/2017     NAVIN Simon, CCC-SLP  Speech Language Pathologist  [phone]  1/9/2017

## 2017-01-09 NOTE — PLAN OF CARE
[place] referral sent via [facility] per [facility].     ELENO called [phone] to complete LOCET, completed with Yin , Next ELENO faxed completed PASRR to [facility] [phone]

## 2017-01-09 NOTE — PLAN OF CARE
Problem: Patient Care Overview  Goal: Plan of Care Review  Outcome: Ongoing (interventions implemented as appropriate)  No acute events.  Patient awake and alert over most of shift, oriented to self.  All vital signs stable.  Patient remains free of falls with bed alarm set and camera at bedside.  Standard precautions maintained and patient remains afebrile.  Repositioned patient at tolerated. Sacral skin tear WDL.  Aspiration precautions maintained.  Up high as possible after eating.  Straws not effective; small sips only.  Patient struggled with swallowing. Patient not tolerating pills.  Crushed and put in pudding and apple sauce.  PRN medication administered twice for nonverbal signs of pain.    Awaiting placement at this time.  Calmed daughters' fears about transporting patient on her own.  Lost IV access at 1730 due to leakage ad cannula out of place. Siderails up x2, camera at bedside, bed alarm set.  Will continue to monitor.

## 2017-01-09 NOTE — PLAN OF CARE
[facility]     Department of Hospital Medicine     [address] [place] [address]     [phone] after hours  [phone] fax       NURSING HOME ORDERS    01/10/2017    Admit to Nursing Home:  Regular Bed        Diagnoses:  Active Hospital Problems    Diagnosis  POA    *Compression of lumbar vertebra [S32.000A]  Yes     Priority: 1 - High    Dementia without behavioral disturbance [F03.90]  Yes     Priority: 2     Failure to thrive in adult [R62.7]  Yes    Vitamin D deficiency [E55.9]  Yes    Cardiac pacemaker [Z95.0]  Yes     Dr. Coats, cardiology [place]        Resolved Hospital Problems    Diagnosis Date Resolved POA   No resolved problems to display.       Patient is homebound due to:  Compression of lumbar vertebra    Allergies:  Review of patient's allergies indicates:   Allergen Reactions    Celexa [citalopram]     Phenergan [promethazine]      Pt states not allergic to phenergan      Bactrim [sulfamethoxazole-trimethoprim] Rash       Vitals:      Routine, once monthly    Diet: Regular diet    Acitivities:     - Up in a chair each morning as tolerated   - Ambulate with assistance to bathroom   - May use self-propelled wheelchair   - LSO brace when out of bed or sitting up     LABS:  Per facility protocol   CMP, CBC each month for 3 months   Pre-albumin each month for 3 months   TSH every year    Nursing Precautions:    - Aspiration precautions:             - Total assistance with meals            -  Upright 90 degrees befor during and after meals             -  Suction at bedside          - Fall precautions per nursing home protocol   - Decubitus precautions:        -  for positioning   - Pressure reducing foam mattress   - Turn patient every two hours. Use wedge pillows to anchor patient    CONSULTS:    Physical Therapy to evaluate and treat   Occupational Therapy to evaluate and treat   Speech Therapy  to evaluate and treat   Nutrition  to evaluate and recommend diet   Psychiatry to evaluate and follow patients for delirium   Hospice to evaluate    MISCELLANEOUS CARE:    Routine Skin for Bedridden Patients:  Apply moisture barrier cream to all    skin folds and wet areas in perineal area daily and after baths and                           all bowel movements.    Medications: Discontinue all previous medication orders, if any. See new list below.     Melina Boss   Home Medication Instructions DESIRE:[id number]    Printed on:01/10/17 1129   Medication Information                      acetaminophen (TYLENOL) 325 MG tablet  Take 2 tablets (650 mg total) by mouth every 4 (four) hours as needed for pain.             alprazolam (XANAX) 0.25 MG tablet  Take 1 tablet (0.25 mg total) by mouth 2 (two) times daily as needed for Anxiety.             aspirin (ECOTRIN) 81 MG EC tablet  Take 81 mg by mouth once daily for cardiac health.             denosumab (PROLIA) 60 mg/mL Syrg  Inject 60 mg into the skin every 6 (six) months for osteoporosis.             diclofenac sodium (VOLTAREN) 1 % Gel  Apply 2 g topically 4 (four) times daily as needed for joint pain.             furosemide (LASIX) 20 MG tablet  Take 2 tablets (40 mg total) by mouth once daily for blood pressure.              hydrocodone-acetaminophen 5-325mg (NORCO) 5-325 mg per tablet  Take 1 tablet by mouth every 6 (six) hours as needed for moderate to severe pain.             lidocaine (LIDODERM) 5 %  Place 1 patch onto the skin once daily as needed for back pain. Remove & Discard patch within 12 hours or as directed by MD             loratadine (CLARITIN) 10 mg tablet  Take 10 mg by mouth daily as needed for Allergies.             mirtazapine (REMERON) 30 MG tablet  Take 1 tablet (30 mg total) by mouth every evening for insomnia.             naproxen (NAPROSYN) 250 MG tablet  Take 1 tablet (250 mg total) by mouth 2 (two) times daily as needed for mild pain.             omeprazole (PRILOSEC) 20 MG  capsule  Take 1 capsule (20 mg total) by mouth once daily for GERD.             ondansetron (ZOFRAN) 4 MG tablet  Take 1 tablet (4 mg total) by mouth every 8 (eight) hours as needed for Nausea.             quetiapine (SEROQUEL) 25 MG Tab  Take 1 tablet (25 mg total) by mouth twice daily for mood.              temazepam (RESTORIL) 15 mg Cap  Take 1 capsule (15 mg total) by mouth nightly as needed for insomnia.             white petrolatum-mineral oil 56.8-42.5% (LACRI-LUBE S.O.P.) 56.8-42.5 % Oint  Place into both eyes every evening for lubrication.                      _________________________________  Russ Zuniga PA-C  01/09/2017

## 2017-01-09 NOTE — PLAN OF CARE
Problem: Patient Care Overview  Goal: Plan of Care Review  Outcome: Ongoing (interventions implemented as appropriate)  No acute events.  Patient awake alert and oriented to self.  Agitated and disoriented to place time and situation.  Daughter at bedside.  AVSS.  Remained free of falls.  Bed alarm set and camera at bedside.  Setbacks today with placement, as patient was denied from two facilities.  SW and Case management continuing to explore placement options.  AVSS.  Standard precautions maintained and patient remains afebrile.  Skin intact apart from minor bruising.  Patient worked with PT, OT and speech therapy with success.  Emotional, physical and psychosocial support provided for patient.  Therapeutic communication implemented with family.  Siderails up x2, call light in reach. Will continue to monitor.

## 2017-01-09 NOTE — PLAN OF CARE
Problem: Patient Care Overview  Goal: Plan of Care Review  Outcome: Ongoing (interventions implemented as appropriate)  No acute events overnight. Patient slept throughout shift. PRN Rozerem given. All meds crushed and given with pudding to maintain aspiration precautions. IVFs discontinued per order. Avys camera + bed alarm in place. No BM overnight. Incontinence care provided as needed. Will continue to monitor patient.

## 2017-01-09 NOTE — TELEPHONE ENCOUNTER
----- Message from Arianna Connolly MA sent at 1/9/2017  8:36 AM CST -----  Patient scheduled. Appointment slips mailed.   ----- Message -----     From: Connor Shaw PA-C     Sent: 1/5/2017   5:17 PM       To: Arianna Connolly MA    Please schedule 8 wk follow up with xrays prior, with any PA. Orders are in.

## 2017-01-09 NOTE — PT/OT/SLP PROGRESS
Physical Therapy  Treatment    Melina Boss   MRN: [id number]   Admitting Diagnosis: Compression of lumbar vertebra    PT Received On: 01/09/17  PT Start Time: 1501     PT Stop Time: 1522    PT Total Time (min): 21 min       Billable Minutes:  Therapeutic Activity 13 and Therapeutic Exercise 8=21    Treatment Type: Treatment  PT/PTA: PT     PTA Visit Number: 0       General Precautions: Standard, aspiration, fall, hearing impaired  Orthopedic Precautions: N/A   Braces: LSO (donned sitting EOB)         Subjective:  Communicated with nurse/pt/dtr prior to session.  Report to nurse after session    Pain Rating: other (see comments) (pt unable to state. did not appear to be in pain)                   Objective:    Pt awake, supine in bed w/ HOB elevated and dtr and friend at bedside    Functional Mobility:  Bed Mobility:   Rolling/Turning to Left: Total assistance  Scooting/Bridging: Total Assistance, Maximum Assistance  Supine to Sit: Total Assistance  Sit to Supine: Total Assistance  drawsheet of 2 to HOB  Transfers:  Sit <> Stand Assistance: Total Assistance  Sit <> Stand Assistive Device: No Assistive Device    Gait:   Gait Distance: unable    Balance: LSO donned and removed in sitting  Static Sit: Pt sits EOB 8 minutes w/ CGA to mod assist. Posterior lean.     Static Stand: Pt stands from EOB x2 trials w/ TA. Pt w/ Forward flexed posture, posterior lean. First trial 22 seconds. Second trial ~10 seconds w/ pt assisted to sitting toward HOB (unable to step) and second scoot for improved bed position       Therapeutic Activities and Exercises:  Pt performs BLE w/ assist: hip abd/add, AP, hip/knee flex /ext, LAQ. x10 each  Pt and family instructed in log roll technique and spinal precautions.      AM-PAC 6 CLICK MOBILITY  How much help from another person does this patient currently need?   1 = Unable, Total/Dependent Assistance  2 = A lot, Maximum/Moderate Assistance  3 = A little, Minimum/Contact Guard/Supervision  4 =  None, Modified [place]/Independent    Turning over in bed (including adjusting bedclothes, sheets and blankets)?: 2  Sitting down on and standing up from a chair with arms (e.g., wheelchair, bedside commode, etc.): 2  Moving from lying on back to sitting on the side of the bed?: 2  Moving to and from a bed to a chair (including a wheelchair)?: 1  Need to walk in hospital room?: 1  Climbing 3-5 steps with a railing?: 1  Total Score: 9    AM-PAC Raw Score CMS G-Code Modifier Level of Impairment Assistance   6 % Total / Unable   7 - 9 CM 80 - 100% Maximal Assist   10 - 14 CL 60 - 80% Moderate Assist   15 - 19 CK 40 - 60% Moderate Assist   20 - 22 CJ 20 - 40% Minimal Assist   23 CI 1-20% SBA / CGA   24 CH 0% Independent/ Mod I     Patient left supine with call button in reach, nurse notified and dtr/friend present.    Assessment:  Melina Boss is a 89 y.o. female with a medical diagnosis of Compression of lumbar vertebra and presents with deficits noted. Pt has LSO to wear OOB. She was cooperative today, limited by dementia, unable to stand or step. Patient will benefit from continued physical therapy to address deficits and improve safety and functional mobility. Continue with physical therapy plan of care.     .    Rehab identified problem list/impairments: Rehab identified problem list/impairments: weakness, impaired endurance, gait instability, impaired balance, visual deficits, decreased lower extremity function, decreased upper extremity function, impaired cognition, impaired coordination    Rehab potential is fair.    Activity tolerance: Fair    Discharge recommendations: Discharge Facility/Level Of Care Needs: intermedicate care facility/nursing home     Barriers to discharge: Barriers to Discharge: Decreased caregiver support    Equipment recommendations: Equipment Needed After Discharge:  (TBD pending d/c disposition)     GOALS:   Physical Therapy Goals        Problem: Physical Therapy Goal     Goal Priority Disciplines Outcome Goal Variances Interventions   Physical Therapy Goal     PT/OT, PT Ongoing (interventions implemented as appropriate)     Description:  Goals to be met by: 7 days ()    Patient will increase functional independence with mobility by performin. Supine to sit with MInimal Assistance= not met  2. Sit to supine with MInimal Assistance= not met  3. Sit to stand transfer with Minimal Assistance= not met  4. Bed to chair transfer with Moderate Assistance using Rolling Walker= not met  5. Gait  x 10 feet with Maximum Assistance using Rolling Walker. = not met  6. Stand for 2 minutes with Moderate Assistance using Rolling Walker= not met  7. Lower extremity exercise program x30 reps per handout, with supervision to improve overall muscle strength= not met                 PLAN:    Patient to be seen 5 x/week  to address the above listed problems via gait training, therapeutic activities, therapeutic exercises  Plan of Care expires: 17  Plan of Care reviewed with: patient, daughter         Chula Mg, PT  2017

## 2017-01-09 NOTE — PLAN OF CARE
Pt's family provided additional facility choice of [facility] in [place].   SW sent referral via [facility] per SHAMA Ram

## 2017-01-09 NOTE — PT/OT/SLP PROGRESS
Occupational Therapy  Treatment    Melina Boss   MRN: [id number]   Admitting Diagnosis: Compression of lumbar vertebra    OT Date of Treatment: 17   OT Start Time: 45  OT Stop Time: 1015  OT Total Time (min): 30 min    Billable Minutes:  Self Care/Home Management 20 and Therapeutic Activity 10    General Precautions: Standard, aspiration, fall, hearing impaired  Orthopedic Precautions: N/A  Braces: LSO    Do you have any cultural, spiritual, Anabaptism conflicts, given your current situation?: None    Subjective:  Communicated with patient prior to session.    Pain Ratin/10              Pain Rating Post-Intervention: 0/10    Objective:  Patient found with: peripheral IV, TLSO, telemetry     Functional Mobility:  Bed Mobility:  Rolling/Turning to Left: Total assistance  Rolling/Turning Right: Total assistance  Scooting/Bridging: Total Assistance  Supine to Sit: Total Assistance  Sit to Supine: Total Assistance    Activities of Daily Living:    UE Dressing Level of Assistance: Maximum assistance (Donning LSO, back gown, and robe)  LE Dressing Level of Assistance: Total assistance  Grooming Position: EOB, Seated  Grooming Level of Assistance: Maximal assistance (oral care with mouthswabs, combing hair, washing face, and applying lipstick)    Balance:   Static Sit: POOR: Needs MODERATE assist to maintain  Dynamic Sit: POOR: N/A    AM-PAC 6 CLICK ADL   How much help from another person does this patient currently need?   1 = Unable, Total/Dependent Assistance  2 = A lot, Maximum/Moderate Assistance  3 = A little, Minimum/Contact Guard/Supervision  4 = None, Modified [place]/Independent    Putting on and taking off regular lower body clothing? : 1  Bathing (including washing, rinsing, drying)?: 1  Toileting, which includes using toilet, bedpan, or urinal? : 1  Putting on and taking off regular upper body clothing?: 2  Taking care of personal grooming such as brushing teeth?: 2  Eating meals?: 1  Total  Score: 8     AM-PAC Raw Score CMS G-Code Modifier Level of Impairment Assistance   6 % Total / Unable   7 - 9 CM 80 - 100% Maximal Assist   10 - 14 CL 60 - 80% Moderate Assist   15 - 19 CK 40 - 60% Moderate Assist   20 - 22 CJ 20 - 40% Minimal Assist   23 CI 1-20% SBA / CGA   24 CH 0% Independent/ Mod I     Patient left supine with call button in reach, family present and all needs met.     ASSESSMENT:  Melina Boss is a 89 y.o. female with a medical diagnosis of Compression of lumbar vertebra and presents with the deficits listed below. Patient tolerated treatment session with much encouragement. Patient was able to follow simple directions with much cueing when sitting EOB. Patient still requires a significant amount of assistance with ADLs and functional mobility. Patient continues to benefit from skilled OT services to achieve maximal independence.    Rehab identified problem list/impairments: Rehab identified problem list/impairments: weakness, impaired endurance, impaired functional mobilty, gait instability, impaired self care skills, impaired balance, impaired cognition, decreased coordination, decreased safety awareness, decreased ROM, impaired fine motor, impaired coordination, visual deficits, decreased lower extremity function, decreased upper extremity function    Rehab potential is fair.    Activity tolerance: Fair    Discharge recommendations: Discharge Facility/Level Of Care Needs: intermedicate care facility/nursing home     Barriers to discharge: Barriers to Discharge: Decreased caregiver support    Equipment recommendations:  (TBD pending progress)     GOALS:   Occupational Therapy Goals        Problem: Occupational Therapy Goal    Goal Priority Disciplines Outcome Interventions   Occupational Therapy Goal     OT, PT/OT Ongoing (interventions implemented as appropriate)    Description:  Goals to be met by: 1/20/17     Patient will increase functional independence with ADLs by  performing:    Feeding with Minimal Assistance.  UE Dressing with Moderate Assistance.  LE Dressing with Maximum Assistance.  Grooming while seated with Moderate Assistance.  Supine to sit with Moderate Assistance.  Stand pivot transfers with Moderate Assistance.                Plan:  Patient to be seen 3 x/week to address the above listed problems via self-care/home management, therapeutic activities, therapeutic exercises  Plan of Care reviewed with: daughter, patient         CRISTIANO Jacques  01/09/2017

## 2017-01-09 NOTE — PLAN OF CARE
Problem: Occupational Therapy Goal  Goal: Occupational Therapy Goal  Goals to be met by: 1/20/17     Patient will increase functional independence with ADLs by performing:    Feeding with Minimal Assistance.  UE Dressing with Moderate Assistance.  LE Dressing with Maximum Assistance.  Grooming while seated with Moderate Assistance.  Supine to sit with Moderate Assistance.  Stand pivot transfers with Moderate Assistance.   Outcome: Ongoing (interventions implemented as appropriate)  Patient's goals are appropriate.  CRISTIANO Jacques  1/9/2017

## 2017-01-09 NOTE — SUBJECTIVE & OBJECTIVE
Interval History: Slept well without any events. Doing about the same. Some periods of confusion associated with pain episodes treated with PRNs medications with periods of continued perseveration in between. More alert/talkative today.     Review of Systems   Unable to perform ROS: Dementia     Objective:     Vital Signs (Most Recent):  Temp: 98.4 °F (36.9 °C) (01/09/17 1105)  Pulse: 65 (01/09/17 1105)  Resp: 16 (01/09/17 1105)  BP: (!) 110/53 (01/09/17 1105)  SpO2: (!) 93 % (01/09/17 1105) Vital Signs (24h Range):  Temp:  [98.4 °F (36.9 °C)-98.6 °F (37 °C)] 98.4 °F (36.9 °C)  Pulse:  [65-69] 65  Resp:  [16-18] 16  BP: (110-163)/(53-74) 110/53     Weight:  (bed scale doesn't work)  Body mass index is 17.75 kg/(m^2).    Intake/Output Summary (Last 24 hours) at 01/09/17 1511  Last data filed at 01/08/17 1800   Gross per 24 hour   Intake              300 ml   Output                0 ml   Net              300 ml      Physical Exam   Constitutional: She appears cachectic. She is easily aroused. No distress.   HENT:   Head: Normocephalic and atraumatic.   Mouth/Throat: Mucous membranes are dry.   Cardiovascular: Normal rate and regular rhythm.  Exam reveals no gallop.    No murmur heard.  Pulmonary/Chest: Effort normal and breath sounds normal. No respiratory distress. She has no wheezes.   Abdominal: Soft. Bowel sounds are normal. She exhibits no distension. There is no splenomegaly or hepatomegaly. There is no tenderness.   Musculoskeletal: Normal range of motion. She exhibits tenderness (Lumbar spine). She exhibits no edema or deformity.   Neurological: She is alert and easily aroused. She is disoriented. No cranial nerve deficit or sensory deficit.   Oriented to person, disoriented to place, time. Episodes of increased confusion intermittently throughout day per family.    Psychiatric: Cognition and memory are impaired.   Nursing note and vitals reviewed.      Significant Labs: All pertinent labs within the past 24  hours have been reviewed.    Significant Imaging: I have reviewed all pertinent imaging results/findings within the past 24 hours.

## 2017-01-09 NOTE — PLAN OF CARE
Problem: Physical Therapy Goal  Goal: Physical Therapy Goal  Goals to be met by: 7 days ()    Patient will increase functional independence with mobility by performin. Supine to sit with MInimal Assistance= not met  2. Sit to supine with MInimal Assistance= not met  3. Sit to stand transfer with Minimal Assistance= not met  4. Bed to chair transfer with Moderate Assistance using Rolling Walker= not met  5. Gait x 10 feet with Maximum Assistance using Rolling Walker. = not met  6. Stand for 2 minutes with Moderate Assistance using Rolling Walker= not met  7. Lower extremity exercise program x30 reps per handout, with supervision to improve overall muscle strength= not met   Goals remain appropriate. Continue with Physical therapy Plan of Care. Chula Mg, PT 2017

## 2017-01-09 NOTE — NURSING
Patient has no IV access as of 1730 per day shift nurse. No IVFs running at this time. Will attempt to start another IV.

## 2017-01-09 NOTE — PLAN OF CARE
Problem: SLP Goal  Goal: SLP Goal  Speech Language Pathology Goals  Goals expected to be met by 1/6:  1. Pt will participate in Modified Barium Swallow Study to determine safest PO diet. - goal met    Goal expected to be me by 1/13:  1. Pt will tolerate pureed diet and honey thick liquids without s/s of aspiration.       Diet recs were for puree/HTL following MBSS on 1/6.  However, family does not wish to follow diet modifications with knowledge of risks of aspiration.  Diet orders are for regular consistencies and thin liquids. Extensive education provided to daughter regarding MBSS results, risks of aspiration, silent aspiration, and strategies to minimize risk of aspiration.  SLP to sign off at this time as pt does not appear able to participate in dysphagia therapy given cognitive status and prognosis for improvement in swallowing function being limited.  Please re-consult if appropriate.  NAVIN Simon, CCC-SLP  Speech Language Pathologist  [phone]  1/9/2017

## 2017-01-09 NOTE — PROGRESS NOTES
[facility] Hospital Medicine  Progress Note    Patient Name: Melina Boss  MRN: [id number]  Patient Class: OP- Observation   Admission Date: 1/4/2017  Length of Stay: 0 days  Expected Discharge Date: 1/9/2017  Attending Physician: Jaspreet Estrada MD  Primary Care Provider: Elvira Em MD    [facility] Medicine Team: [facility] HOSP MED E Russ Zuniga PA-C    Subjective:     Principal Problem:Compression of lumbar vertebra    HPI:  Patient is a 89 year old lady with a h/o osteoarthritis, dementia, bradycardia s/p PPM, GERD, OA, and vitamin D deficiency.  She was transferred from [facility] for neurosurgical evaluation of L2 compression fracture.  Daughter at bedside provides most of history.  She reports a sudden change in patient's posture on 1/1/2017 when patient was shifted from her car to her wheelchair by her caregiver.  Since that time, she has complained of LBP.  Today, however, she was unable to perform her ADLs.  CT spine revealed L2 compression fracture with extension into the left pedicle.  She continues to have back pain, but states it is better with pain meds.  She was in her usual state of health prior to this event.  Denies loss of bowel/bladder control.  Denies chest pain, SOB, N/V, fever/chills.  Patient is very hard of hearing and is able to hear best out of L ear.  She uses a wheelchair at baseline.    Hospital Course:  89 yo female admitted to observation for further evaluation and treatment of compression of lumbar vertebra. Neurosurgery consulted, family does not wish to pursue surgical options at this time, LSO brace, repeat XR in brace (supine and sitting) pending. Continue supportive care and pain control. PT/OT consulted, recommending NH SNF. CM/SW assisting in placement. Patient had prior placement in NH facility in [place], but they are unable to accommodate patients needs at this time. Will continue pain control until placement. SLP consulted, barium swallow performed,  recommendations added to patient diet to minimize risk of aspiration. Patient's family requesting dietary modifications be removed so that patient may have increased PO intake as she does not like thickener or pureed food. Patient started on seroquel BID to help with agitation and decreased pain regimen.    Interval History: Slept well without any events. Doing about the same. Some periods of confusion associated with pain episodes treated with PRNs medications with periods of continued perseveration in between. More alert/talkative today.     Review of Systems   Unable to perform ROS: Dementia     Objective:     Vital Signs (Most Recent):  Temp: 98.4 °F (36.9 °C) (01/09/17 1105)  Pulse: 65 (01/09/17 1105)  Resp: 16 (01/09/17 1105)  BP: (!) 110/53 (01/09/17 1105)  SpO2: (!) 93 % (01/09/17 1105) Vital Signs (24h Range):  Temp:  [98.4 °F (36.9 °C)-98.6 °F (37 °C)] 98.4 °F (36.9 °C)  Pulse:  [65-69] 65  Resp:  [16-18] 16  BP: (110-163)/(53-74) 110/53     Weight:  (bed scale doesn't work)  Body mass index is 17.75 kg/(m^2).    Intake/Output Summary (Last 24 hours) at 01/09/17 1511  Last data filed at 01/08/17 1800   Gross per 24 hour   Intake              300 ml   Output                0 ml   Net              300 ml      Physical Exam   Constitutional: She appears cachectic. She is easily aroused. No distress.   HENT:   Head: Normocephalic and atraumatic.   Mouth/Throat: Mucous membranes are dry.   Cardiovascular: Normal rate and regular rhythm.  Exam reveals no gallop.    No murmur heard.  Pulmonary/Chest: Effort normal and breath sounds normal. No respiratory distress. She has no wheezes.   Abdominal: Soft. Bowel sounds are normal. She exhibits no distension. There is no splenomegaly or hepatomegaly. There is no tenderness.   Musculoskeletal: Normal range of motion. She exhibits tenderness (Lumbar spine). She exhibits no edema or deformity.   Neurological: She is alert and easily aroused. She is disoriented. No  cranial nerve deficit or sensory deficit.   Oriented to person, disoriented to place, time. Episodes of increased confusion intermittently throughout day per family.    Psychiatric: Cognition and memory are impaired.   Nursing note and vitals reviewed.      Significant Labs: All pertinent labs within the past 24 hours have been reviewed.    Significant Imaging: I have reviewed all pertinent imaging results/findings within the past 24 hours.    Assessment/Plan:      * Compression of lumbar vertebra  - Appreciate neurosurgery's assistance.  Family does not wish to pursue surgical options.  Patient is nonambulatory at baseline.  LSO brace when up and OOB.  - Sitting and supine x-rays in brace with L2 compression fracture with no change in alignment of the lumbar spine on the sitting and upright views. Neurosurgery signed off, will arrange 8 week follow up.   - Supportive care. Continue oral analgesics PRN. Patient with intermittent episodes of increased confusion.   - Pain control regimen: Scheduled naproxen 250 mg BID WM, PRN tylenol, norco 5-325, lidoderm  - PT/OT consults recommending NH SNF. CM/SW assisting with placement, patient was to be discharged to facility in [place] today but family visited to sign paperwork and report facility is unable to accommodate patients needs (they will not administer medications at night) and they would like to pursue alternative placement. Family reports they are unable to care for patient at home.  - Main insurance of medicare, will not qualify for SNF unfortunately without inpatient status. SW reviewed chart and patient does not qualify for inpatient status. Family and patient will need to pursue correction NH care at this time. Discussed with family. CM/SW assisting with placement    Dementia without behavioral disturbance  - Continue Remeron 30mg qHS, Xanax 0.25mg BID PRN.  - Daughter notes patient tends to sundown.  - Scheduled Seroquel 25 mg BID, decrease opiate pain  medications as above    DELIRIUM BEHAVIOR MANAGEMENT  - Minimize use of restraints; if physical restraints necessary, please utilize medical/chemical prns for agitation.  - Keep shades open and room lit during day and room dim at night in order to promote healthy circadian rhythms.  - Encourage family at bedside  - Keep whiteboard in patient's room current with the date and name of the members of patient's team for easy patient self re-orientation.  - Avoid benzodiazepines, antihistamines, anticholinergics, hypnotics, and minimize opiates while controlling for pain as these medications may exacerbate delirium.    Vitamin D deficiency  - Vitamin D 25, will start daily replacement    Cardiac pacemaker  - Placed secondary to bradycardia.  Stable.    Failure to thrive in adult  - Family to place patient in correction nursing home care, will look at options starting Monday, unable to care for patient at home at this time  - Nursing to document % food eaten. will give gentle IVFs as patient with decreased PO intake.     VTE Risk Mitigation         Ordered     enoxaparin injection 40 mg  Daily     Route:  Subcutaneous        01/08/17 1150     Medium Risk of VTE  Once      01/04/17 2357     Place SAUL hose  Until discontinued      01/04/17 2357     Place sequential compression device  Until discontinued      01/04/17 2357          Russ Zuniga PA-C  Department of Hospital Medicine   [facility]

## 2017-01-10 VITALS
BODY MASS INDEX: 17.68 KG/M2 | SYSTOLIC BLOOD PRESSURE: 148 MMHG | HEIGHT: 66 IN | RESPIRATION RATE: 20 BRPM | OXYGEN SATURATION: 92 % | HEART RATE: 83 BPM | WEIGHT: 110 LBS | TEMPERATURE: 98 F | DIASTOLIC BLOOD PRESSURE: 72 MMHG

## 2017-01-10 PROBLEM — K59.00 CONSTIPATION: Status: ACTIVE | Noted: 2017-01-10

## 2017-01-10 LAB
ANION GAP SERPL CALC-SCNC: 8 MMOL/L
BUN SERPL-MCNC: 23 MG/DL
CALCIUM SERPL-MCNC: 8.5 MG/DL
CHLORIDE SERPL-SCNC: 106 MMOL/L
CO2 SERPL-SCNC: 29 MMOL/L
CREAT SERPL-MCNC: 0.8 MG/DL
ERYTHROCYTE [DISTWIDTH] IN BLOOD BY AUTOMATED COUNT: 16.1 %
EST. GFR  (AFRICAN AMERICAN): >60 ML/MIN/1.73 M^2
EST. GFR  (NON AFRICAN AMERICAN): >60 ML/MIN/1.73 M^2
GLUCOSE SERPL-MCNC: 90 MG/DL
HCT VFR BLD AUTO: 35.9 %
HGB BLD-MCNC: 11.3 G/DL
MCH RBC QN AUTO: 27.2 PG
MCHC RBC AUTO-ENTMCNC: 31.5 %
MCV RBC AUTO: 87 FL
PLATELET # BLD AUTO: 201 K/UL
PMV BLD AUTO: 9.3 FL
POTASSIUM SERPL-SCNC: 4.3 MMOL/L
RBC # BLD AUTO: 4.15 M/UL
SODIUM SERPL-SCNC: 143 MMOL/L
WBC # BLD AUTO: 7.11 K/UL

## 2017-01-10 PROCEDURE — 63600175 PHARM REV CODE 636 W HCPCS: Performed by: PHYSICIAN ASSISTANT

## 2017-01-10 PROCEDURE — 85027 COMPLETE CBC AUTOMATED: CPT

## 2017-01-10 PROCEDURE — 25000003 PHARM REV CODE 250: Performed by: PHYSICIAN ASSISTANT

## 2017-01-10 PROCEDURE — 36415 COLL VENOUS BLD VENIPUNCTURE: CPT

## 2017-01-10 PROCEDURE — 90791 PSYCH DIAGNOSTIC EVALUATION: CPT | Mod: S$PBB,,, | Performed by: NURSE PRACTITIONER

## 2017-01-10 PROCEDURE — 80048 BASIC METABOLIC PNL TOTAL CA: CPT

## 2017-01-10 PROCEDURE — 99217 PR OBSERVATION CARE DISCHARGE: CPT | Mod: ,,, | Performed by: HOSPITALIST

## 2017-01-10 PROCEDURE — G0378 HOSPITAL OBSERVATION PER HR: HCPCS

## 2017-01-10 RX ORDER — GLYCERIN 1 G/1
1 SUPPOSITORY RECTAL ONCE
Status: COMPLETED | OUTPATIENT
Start: 2017-01-10 | End: 2017-01-10

## 2017-01-10 RX ADMIN — ASPIRIN 81 MG: 81 TABLET, COATED ORAL at 09:01

## 2017-01-10 RX ADMIN — HYDROCODONE BITARTRATE AND ACETAMINOPHEN 1 TABLET: 5; 325 TABLET ORAL at 09:01

## 2017-01-10 RX ADMIN — GLYCERIN 1 SUPPOSITORY: 2.1 SUPPOSITORY RECTAL at 03:01

## 2017-01-10 RX ADMIN — FUROSEMIDE 40 MG: 40 TABLET ORAL at 09:01

## 2017-01-10 RX ADMIN — QUETIAPINE FUMARATE 25 MG: 25 TABLET, FILM COATED ORAL at 09:01

## 2017-01-10 RX ADMIN — STANDARDIZED SENNA CONCENTRATE AND DOCUSATE SODIUM 1 TABLET: 8.6; 5 TABLET, FILM COATED ORAL at 09:01

## 2017-01-10 RX ADMIN — POLYETHYLENE GLYCOL 3350 17 G: 17 POWDER, FOR SOLUTION ORAL at 02:01

## 2017-01-10 RX ADMIN — LIDOCAINE 1 PATCH: 50 PATCH TOPICAL at 02:01

## 2017-01-10 RX ADMIN — SODIUM PHOSPHATE, DIBASIC AND SODIUM PHOSPHATE, MONOBASIC 1 ENEMA: 7; 19 ENEMA RECTAL at 03:01

## 2017-01-10 RX ADMIN — HYDROCODONE BITARTRATE AND ACETAMINOPHEN 1 TABLET: 5; 325 TABLET ORAL at 04:01

## 2017-01-10 RX ADMIN — PANTOPRAZOLE SODIUM 40 MG: 40 TABLET, DELAYED RELEASE ORAL at 09:01

## 2017-01-10 RX ADMIN — ENOXAPARIN SODIUM 40 MG: 100 INJECTION SUBCUTANEOUS at 11:01

## 2017-01-10 RX ADMIN — ALPRAZOLAM 0.25 MG: 0.25 TABLET ORAL at 09:01

## 2017-01-10 NOTE — NURSING
Report called to nurse at [facility]. [facility] ambulance here to transport pt to [place]. Daughter to follow in her own vehicle.

## 2017-01-10 NOTE — ASSESSMENT & PLAN NOTE
- Continue Remeron 30mg qHS, Xanax 0.25mg BID PRN.  - Daughter notes patient tends to sundown.  - Scheduled Seroquel 25 mg BID, decrease opiate pain medications as above  - Psych consulted

## 2017-01-10 NOTE — NURSING
/91 automatic this AM. PA notified. After pt calmed down, rechecked manually 144/70, PA notified. No new orders at this time.

## 2017-01-10 NOTE — DISCHARGE SUMMARY
[facility] Medicine  Discharge Summary      Patient Name: Melina Boss  MRN: [id number]  Admission Date: 1/4/2017  Hospital Length of Stay: 0 days  Discharge Date and Time: 1/10/2017 3:01 PM  Attending Physician: Jaspreet Estrada MD   Discharging Provider: Russ Zuniga PA-C  Primary Care Provider: Elvira Em MD  [facility] Medicine Team: [facility] HOSP MED E Russ Zuniga PA-C    HPI:   Patient is a 89 year old lady with a h/o osteoarthritis, dementia, bradycardia s/p PPM, GERD, OA, and vitamin D deficiency.  She was transferred from [facility] for neurosurgical evaluation of L2 compression fracture.  Daughter at bedside provides most of history.  She reports a sudden change in patient's posture on 1/1/2017 when patient was shifted from her car to her wheelchair by her caregiver.  Since that time, she has complained of LBP.  Today, however, she was unable to perform her ADLs.  CT spine revealed L2 compression fracture with extension into the left pedicle.  She continues to have back pain, but states it is better with pain meds.  She was in her usual state of health prior to this event.  Denies loss of bowel/bladder control.  Denies chest pain, SOB, N/V, fever/chills.  Patient is very hard of hearing and is able to hear best out of L ear.  She uses a wheelchair at baseline.    * No surgery found *      Indwelling Lines/Drains at time of discharge:   Lines/Drains/Airways          No matching active lines, drains, or airways        Hospital Course:   89 yo female admitted to observation for further evaluation and treatment of compression of lumbar vertebra. Neurosurgery consulted, family does not wish to pursue surgical options at this time, LSO brace recommended with patient OOB. Continue supportive care and pain control. PT/OT consulted, recommending NH/SNF. Since patient's primary insurance Medicare patient does not qualify for SNF placement at this time because she is in observation  status. CM/SW assisting with retirement NH placement. Patient had prior placement in [facility] NH facility in [place], but they are unable to accommodate patient's needs at this time. SLP consulted, barium swallow performed, recommendations added to patient diet to minimize risk of aspiration, however patient's family requesting they be removed as patient does not like thickeners. Family was made aware of risk of aspiration and consequences of aspiration, they continued to request a non-modified diet. Patient started on seroquel BID to help with agitation and decreased pain regimen. Patient discharged to NH under retirement care with hospice. Patient discharged with lidoderm, naproxen, norco 5-325 mg for pain PRN and Seroquel 25 mg BID.     Consults:   Consults         Status Ordering Provider     Case Management/  Once     Provider:  (Not yet assigned)    Acknowledged HANK MCCARTNEY     Inpatient consult to Psychiatry  Once     Provider:  (Not yet assigned)    Acknowledged TONYA CHEN          Significant Diagnostic Studies: Labs:   BMP:   Recent Labs  Lab 01/09/17  0356 01/10/17  0401   GLU 88 90    143   K 3.9 4.3    106   CO2 33* 29   BUN 22 23   CREATININE 0.8 0.8   CALCIUM 7.8* 8.5*   , CMP   Recent Labs  Lab 01/09/17  0356 01/10/17  0401    143   K 3.9 4.3    106   CO2 33* 29   GLU 88 90   BUN 22 23   CREATININE 0.8 0.8   CALCIUM 7.8* 8.5*   ANIONGAP 5* 8   ESTGFRAFRICA >60.0 >60.0   EGFRNONAA >60.0 >60.0    and All labs within the past 24 hours have been reviewed  Radiology: X-Ray: CXR: X-Ray Chest 1 View (CXR):   Results for orders placed or performed during the hospital encounter of 01/04/17   X-Ray Chest 1 View    Narrative    Pacemaker identified.  Mild cardiomegaly.  Ill-defined patchy airspace disease identified bilaterally.  Left pleural effusion.  No extrapulmonary air identified.    Impression     See above      Electronically signed by: Christiano Lucas  MD  Date:     01/07/17  Time:    08:07     and Xray lumbar: L2 compression fracture with no change in alignment of the lumbar spine on the sitting and upright views.    Pending Diagnostic Studies:     None        Final Active Diagnoses:    Diagnosis Date Noted POA    PRINCIPAL PROBLEM:  Compression of lumbar vertebra [S32.000A] 01/04/2017 Yes    Dementia without behavioral disturbance [F03.90] 09/14/2016 Yes    Constipation [K59.00] 01/10/2017 Yes    Failure to thrive in adult [R62.7] 01/08/2017 Yes    Vitamin D deficiency [E55.9] 09/13/2016 Yes    Cardiac pacemaker [Z95.0] 09/13/2016 Yes      Problems Resolved During this Admission:    Diagnosis Date Noted Date Resolved POA      * Compression of lumbar vertebra  - Appreciate neurosurgery's assistance.  Family does not wish to pursue surgical options.  Patient is nonambulatory at baseline.  LSO brace when up and OOB.  - Neurosurgery signed off, will arrange 8 week follow up.   - Supportive care. Continue oral analgesics PRN.  - Pain control regimen: Scheduled naproxen 250 mg BID WM, PRN tylenol, norco 5-325, lidoderm  - PT/OT consults recommending NH SNF. CM/SW assisting with placement  - Main insurance of medicare, will not qualify for SNF unfortunately without inpatient status. SW reviewed chart and patient does not qualify for inpatient status. Family and patient will need to pursue [facility] NH care at this time. Discussed with family. CM/SW assisting with placement    Dementia without behavioral disturbance  - Continue Remeron 30mg qHS, Xanax 0.25mg BID PRN.  - Daughter notes patient tends to sundown.  - Scheduled Seroquel 25 mg BID, decrease opiate pain medications as above  - Psych consulted    Vitamin D deficiency  - Vitamin D 25, will start daily replacement    Cardiac pacemaker  - Placed secondary to bradycardia.  Stable.    Failure to thrive in adult  - Family to place patient in [facility] nursing home care  - hospice consulted    Constipation  -  enema and suppositories given, family states suppositories better than enemas in past      Discharged Condition: fair    Disposition: Long Term Care    Follow Up:  Follow-up Information     Follow up with Elvira Em MD In 2 weeks.    Specialty:  Family Medicine    Contact information:    [place] [address]  [phone]          Follow up with Connor Shaw PA-C On 2/10/2017.    Specialty:  Neurosurgery    Why:  Please keep this follow up appointment.     Contact information:    [address]  [facility] [id number] [phone]          Follow up with [facility].    Specialties:  Nursing Home Agency, SNF Agency    Why:  Nursing Home with Hospice Consult     Contact information:    [place] [address]  [phone]          Patient Instructions:     X-Ray Lumbar Spine AP And Lateral   Standing Status: Future  Standing Exp. Date: 01/05/18   Order Specific Question Answer Comments   Reason for Exam: outpatient follow up L2 fx. in brace upright and supine.    May the Radiologist modify the order per protocol to meet the clinical needs of the patient? Yes      Diet general     Activity as tolerated     Call MD for:  severe uncontrolled pain       Medications:  Reconciled Home Medications:   Current Discharge Medication List      START taking these medications    Details   hydrocodone-acetaminophen 5-325mg (NORCO) 5-325 mg per tablet Take 1 tablet by mouth every 6 (six) hours as needed for Pain.  Qty: 90 tablet, Refills: 0      naproxen (NAPROSYN) 250 MG tablet Take 1 tablet (250 mg total) by mouth 2 (two) times daily as needed (pain).      quetiapine (SEROQUEL) 25 MG Tab Take 1 tablet (25 mg total) by mouth 2 (two) times daily.         CONTINUE these medications which have CHANGED    Details   omeprazole (PRILOSEC) 20 MG capsule Take 1 capsule (20 mg total) by mouth once daily.  Qty: 180 capsule, Refills: 3      temazepam (RESTORIL) 15 mg Cap Take 1 capsule (15 mg  total) by mouth nightly as needed (insomnia).  Qty: 90 capsule, Refills: 0         CONTINUE these medications which have NOT CHANGED    Details   acetaminophen (TYLENOL) 325 MG tablet Take 2 tablets (650 mg total) by mouth every 4 (four) hours as needed.  Qty: 30 tablet, Refills: 0      alprazolam (XANAX) 0.25 MG tablet Take 1 tablet (0.25 mg total) by mouth 2 (two) times daily as needed for Anxiety.  Qty: 60 tablet, Refills: 5      aspirin (ECOTRIN) 81 MG EC tablet Take 81 mg by mouth once daily.      denosumab (PROLIA) 60 mg/mL Syrg Inject 60 mg into the skin every 6 (six) months.      diclofenac sodium (VOLTAREN) 1 % Gel Apply 2 g topically 4 (four) times daily as needed.  Qty: 100 g, Refills: 6      furosemide (LASIX) 20 MG tablet Take 2 tablets (40 mg total) by mouth once daily.  Qty: 180 tablet, Refills: 3      lidocaine (LIDODERM) 5 % Place 1 patch onto the skin once daily. Remove & Discard patch within 12 hours or as directed by MD  Qty: 30 patch, Refills: 6      loratadine (CLARITIN) 10 mg tablet Take 10 mg by mouth daily as needed for Allergies.      mirtazapine (REMERON) 30 MG tablet Take 1 tablet (30 mg total) by mouth every evening.  Qty: 90 tablet, Refills: 3      ondansetron (ZOFRAN) 4 MG tablet Take 1 tablet (4 mg total) by mouth every 8 (eight) hours as needed for Nausea.  Qty: 30 tablet, Refills: 6      white petrolatum-mineral oil 56.8-42.5% (LACRI-LUBE S.O.P.) 56.8-42.5 % Oint Place into both eyes every evening.  Qty: 7 g, Refills: 11         STOP taking these medications       busPIRone (BUSPAR) 5 MG Tab Comments:   Reason for Stopping:         ciclopirox (PENLAC) 8 % Soln Comments:   Reason for Stopping:             Time spent on the discharge of patient: 35 minutes    Russ Zuniga PA-C  Department of [facility] Medicine  [facility]

## 2017-01-10 NOTE — ASSESSMENT & PLAN NOTE
- Appreciate neurosurgery's assistance.  Family does not wish to pursue surgical options.  Patient is nonambulatory at baseline.  LSO brace when up and OOB.  - Neurosurgery signed off, will arrange 8 week follow up.   - Supportive care. Continue oral analgesics PRN.  - Pain control regimen: Scheduled naproxen 250 mg BID WM, PRN tylenol, norco 5-325, lidoderm  - PT/OT consults recommending NH SNF. CM/SW assisting with placement  - Main insurance of medicare, will not qualify for SNF unfortunately without inpatient status. SW reviewed chart and patient does not qualify for inpatient status. Family and patient will need to pursue senior care NH care at this time. Discussed with family. CM/SW assisting with placement

## 2017-01-10 NOTE — PLAN OF CARE
Problem: Patient Care Overview  Goal: Plan of Care Review  Outcome: Ongoing (interventions implemented as appropriate)  Pt aaox1 through shift, confused, at baseline with history of dementia. VSS on RA. incontinence care provided. Pt repositioned frequently. mepilex applied to sacral pressure ulcer. Pt c/o pain intermittently. Prn norco administered. Bed in lowest position, wheels locked, avasys camera in room, daughter to remain at bedside.

## 2017-01-10 NOTE — PLAN OF CARE
Updated orders and PPD results sent via [facility].   SW now awaits call report information to be given   SW called [facility] at [phone], spoke to dispatcher who arranged pt's transport by stretcher  Placed in [facility] CALL status.

## 2017-01-10 NOTE — PLAN OF CARE
Problem: Patient Care Overview  Goal: Plan of Care Review  Outcome: Ongoing (interventions implemented as appropriate)  No acute events overnight, slept uninterrupted throughout shift. Patient remains AAO x1, and did become a bit agitated before bedtime attempting to exit bed. PM medications given with PRN Xanax + Rozerem. All medications crushed with pudding. Patient saturations 88-90% on RA. However, refused NC, pulling at is as I attempted to place in her nose. Family a bedside during this time stating she had the same issue during the day. Patient remains asymptomatic. No BM overnight.    Aspiration & Delirium precautions maintained. Avys camera in place. TEDs + SCDs + bilateral heel protectors in place. Will continue to monitor.

## 2017-01-10 NOTE — PLAN OF CARE
1/10/2017 1:06:41 PM Note: Thank you. Your nurse may call report to Mrs. Sainz @ [phone]. [email or web address]   For Karyn    Noe added to Epic Treatment team     Transport taken out of WILL CALL, will arrive within the hour     CHARLIE Hamilton (attending nurse) made aware of call report information

## 2017-01-10 NOTE — CONSULTS
1/10/2017 8:33 AM   Melina Boss   3/4/1920   [id number]  DATE OF ADMISSION: 1/4/2017  5:23 PM           PSYCHIATRY CONSULT NOTE    Brief HPI:  Ms. Boss is a 89 y.o. Female with past medical history significant for Osteoarthritis, Fracture of femoral neck, right, closed (4/30/2013), CHF, cardiomyopathy, cardiac pacemaker placement and HTN. She presents as a transfer [facility] for neruosurgical evaluation of compression fracture of L2.      History from patients daughter, reveals a quick/sudden change in posture when the patient was shifted from her car to the wheelchair 4 days back by the caregiver. Since that incident, patient has been complaining of pain at the lower back. Today it was very severe that she wasn't able to do her basic day to day activities of going to bathroom etc. Denied any focal neurological deficit or sensory loss. In [facility] ED pain was acutely treated with a dose of IV morphine. CT spine revealed compression fracture of L2 with extension into the left pedicle and hence transferred to [facility].      Interval History:  Patient with recurrence of lower back pain, but family reports increased confusion after IV medication administration. Family extremely concerned about patient returning home as they report she does not have good care at home and they will be unable to fully care for her. Patient reporting pain on exam this morning, has perseverative thoughts/statements, confusion of object names. Daughter reports this is her baseline dementia but pain keeping her from relaxing this morning.    Chief Complaint /Reason for Consult: Altered Mental Status       Assessment:  Delirium due to General Medical Condition    Recommendations:  · PSYCH Meds-  · Continue Seroquel 25 mg Bid    Dispo-Seek Does not meet criteria for Inpt admission until stabilization of psychiatric symptoms.  · The above was discussed with staff psychiatrist   · Thank you for this consult will continue to  follow    DELIRIUM BEHAVIOR MANAGEMENT  - Minimize use of restraints; if physical restraints necessary, please utilize medical/chemical prns for agitation.  - Keep shades open and room lit during day and room dim at night in order to promote healthy circadian rhythms.  - Encourage family at bedside  - Keep whiteboard in patient's room current with the date and name of the members of patient's team for easy patient self re-orientation.  -Avoid benzodiazepines, antihistamines, anticholinergics, hypnotics, and minimize opiates while controlling for pain as these medications may exacerbate delirium.        Subjective:    Nurses Note:  Ongoing (interventions implemented as appropriate)  No acute events overnight, slept uninterrupted throughout shift. Patient remains AAO x1, and did become a bit agitated before bedtime attempting to exit bed. PM medications given with PRN Xanax + Rozerem. All medications crushed with pudding. Patient saturations 88-90% on RA. However, refused NC, pulling at is as I attempted to place in her nose. Family a bedside during this time stating she had the same issue during the day. Patient remains asymptomatic. No BM overnight.     Aspiration & Delirium precautions maintained. Avys camera in place. TEDs + SCDs + bilateral heel protectors in place. Will continue to monitor.       History of Present Illness:   Melina Boss is a 89 y.o. female with past psychiatric history of anxiety, currently presenting with Compression of lumbar vertebra.  Psychiatry was originally consulted to address the patient's symptoms of Altered Mental Status.  Ms Boss was lying in bed with her daughter in the room when I spoke with her, she was awake and alert, oriented to person only, she smiled at me and was cooperative, she had difficulty hearing; however she answered most questions appropriately, her daughter provided her history, according to her daughter she lived in a assisted living facility near her  granddaughter who is a MD, she has 24 hour care and has a set routine daily, she likes to read and reads daily, her daughter brings her to the local library weekly, she is normally oriented to person only and is calm; however since being in the hospital she has been agitated and seeing people in her room, this morning the daughter feels that her Mother is back to her baseline, no SI/HI, no A/V hallucinations at present    Collateral:  Daughter    Currently, the patient is endorsing the following:    Psychiatric Review Of Systems - Is patient experiencing or having changes in:  sleep: no  appetite: no  weight: no  energy/anergy: yes  interest/pleasure/anhedonia: no  somatic symptoms: no  libido: not assessed  guilty/hopelessness: no  concentration: no  S.I.B.s/risky behavior: no  SI/SA:  no    anxiety/panic: no  Agoraphobia:  no  Social phobia:  no  Recurrent nightmares:  no  hyper startle response:  no  Avoidance: no  Recurrent thoughts:  no  Recurrent behaviors:  no    Irritability: no  Racing thoughts: no  Impulsive behaviors: no  Pressured speech:  no    Paranoia:no  Delusions: no  AVH:yes    Past Medical History   Diagnosis Date    Anxiety associated with depression     Arthritis     Encounter for blood transfusion     Failure to thrive in adult 1/8/2017    Fracture of femoral neck, right, closed 4/30/2013    Hypertension     Nausea     Osteoarthritis     Urinary tract infection        Past Surgical History   Procedure Laterality Date    Knee surgery       right TKA    Varicose veins       venous stripping    Cardiac pacemaker placement      Total hip arthroplasty Bilateral     Blepheroplasty      Ear tuck      Deviated septum repair         Social History     Social History    Marital status: Single     Spouse name: N/A    Number of children: N/A    Years of education: N/A     Social History Main Topics    Smoking status: Never Smoker    Smokeless tobacco: Never Used    Alcohol use No     Drug use: No    Sexual activity: Not Currently     Other Topics Concern    None     Social History Narrative       Current Facility-Administered Medications   Medication Dose Route Frequency Provider Last Rate Last Dose    acetaminophen tablet 650 mg  650 mg Oral Q6H PRN Russ Zuniga PA-C        albuterol-ipratropium 2.5mg-0.5mg/3mL nebulizer solution 3 mL  3 mL Nebulization Q4H PRN Maki Neely PA-C        alprazolam tablet 0.25 mg  0.25 mg Oral BID PRN Maki Neely PA-C   0.25 mg at 01/09/17 2104    aspirin EC tablet 81 mg  81 mg Oral Daily Maki Neely PA-C   81 mg at 01/09/17 0905    dextrose 50% injection 12.5 g  12.5 g Intravenous PRN Maki Neely PA-C        dextrose 50% injection 25 g  25 g Intravenous PRN Maki Neely PA-C        enoxaparin injection 40 mg  40 mg Subcutaneous Daily Russ Zuniga PA-C   Stopped at 01/09/17 1200    furosemide tablet 40 mg  40 mg Oral Daily Maki Neely PA-C   40 mg at 01/09/17 0905    glucagon (human recombinant) injection 1 mg  1 mg Intramuscular PRN Mkai Neely PA-C        glucose chewable tablet 16 g  16 g Oral PRN Maki Neely PA-C        glucose chewable tablet 24 g  24 g Oral PRN Maki Neely PA-C        hydrocodone-acetaminophen 5-325mg per tablet 1 tablet  1 tablet Oral Q6H PRN Russ Zuniga PA-C   1 tablet at 01/09/17 1218    lidocaine 5 % patch 1 patch  1 patch Transdermal Q24H Russ Zuniga PA-C   1 patch at 01/09/17 1209    loperamide capsule 2 mg  2 mg Oral PRN Maki Neely PA-C        mirtazapine tablet 30 mg  30 mg Oral QHS Maki Neely PA-C   30 mg at 01/09/17 2046    naproxen tablet 250 mg  250 mg Oral BID WM Russ Zuniga PA-C   250 mg at 01/09/17 1840    ondansetron disintegrating tablet 8 mg  8 mg Oral Q8H PRN Maki Neely PA-C        ondansetron injection 4 mg  4 mg Intravenous Q8H PRN Maki Neely PA-C        pantoprazole EC  tablet 40 mg  40 mg Oral Daily Maki Neely PA-C   40 mg at 01/09/17 0905    polyethylene glycol packet 17 g  17 g Oral TID PRN Maki Neely PA-C        quetiapine tablet 25 mg  25 mg Oral BID Russ Zuniga PA-C   25 mg at 01/09/17 2046    ramelteon tablet 8 mg  8 mg Oral Nightly PRN Maki Neely PA-C   8 mg at 01/09/17 2046    senna-docusate 8.6-50 mg per tablet 1 tablet  1 tablet Oral BID Maki Neely PA-C   1 tablet at 01/09/17 2046       Review of patient's allergies indicates:   Allergen Reactions    Celexa [citalopram]     Phenergan [promethazine]      Pt states not allergic to phenergan      Bactrim [sulfamethoxazole-trimethoprim] Rash       Scheduled Meds:   aspirin  81 mg Oral Daily    enoxaparin  40 mg Subcutaneous Daily    furosemide  40 mg Oral Daily    lidocaine  1 patch Transdermal Q24H    mirtazapine  30 mg Oral QHS    naproxen  250 mg Oral BID WM    pantoprazole  40 mg Oral Daily    quetiapine  25 mg Oral BID    senna-docusate 8.6-50 mg  1 tablet Oral BID     acetaminophen, albuterol-ipratropium 2.5mg-0.5mg/3mL, alprazolam, dextrose 50%, dextrose 50%, glucagon (human recombinant), glucose, glucose, hydrocodone-acetaminophen 5-325mg, loperamide, ondansetron, ondansetron, polyethylene glycol, ramelteon  Psychotherapeutics     Start     Stop Route Frequency Ordered    01/04/17 2230  mirtazapine tablet 30 mg      -- Oral Nightly 01/04/17 2229 01/04/17 2328  alprazolam tablet 0.25 mg      -- Oral 2 times daily PRN 01/04/17 2229    01/05/17 0057  ramelteon tablet 8 mg      -- Oral Nightly PRN 01/04/17 2357    01/08/17 0900  quetiapine tablet 25 mg      -- Oral 2 times daily 01/08/17 0751          Past Psychiatric History:  Previous Medication Trials: yes   Previous Psychiatric Hospitalizations: no   Previous Suicide Attempts: no   History of Violence: no  Outpatient Psychiatrist: no    Social History:  Marital Status:   Children: 2   Employment  Status/Info: retired  Education: post college graduate work or degree  Special Ed: no  : [employer] Nurse in [employer]  Faith: Episcopalian  Housing Status: Lives in assisted living facility  Hobbies/Leisure time: reads  History of phys/sexual abuse: no  Access to gun: no    Family Psychiatric History: no    Substance Abuse History:  Recreational Drugs: denies  Use of Alcohol: denied  Rehab History:no   Tobacco Use:no  Use of Caffeine: denies use  Use of OTC: no  Legal consequences of chemical use: no    Legal History:  Past Charges/Incarcerations:no    Pending charges:no     Psychosocial Stressors: none   Functioning Relationships: good support system  Strengths AND Liabilities  Strength: Patient is intelligent., Strength: Patient has positive support network., Liability: Patient has poor health.      Is the patient aware of the biomedical complications associated with substance abuse and mental illness? yes    Does the patient have an Advance Directive for Mental Health treatment? no   - if yes, inform patient to bring copy.    Objective:    Vitals:    01/09/17 1943   BP: (!) 161/70   Pulse: 87   Resp: 18   Temp: 99.1 °F (37.3 °C)           Recent Labs  Lab 01/10/17  0401   GLU 90   CALCIUM 8.5*      K 4.3   CO2 29      BUN 23   CREATININE 0.8     Lab Results   Component Value Date    WBC 7.11 01/10/2017    HGB 11.3 (L) 01/10/2017    HCT 35.9 (L) 01/10/2017    MCV 87 01/10/2017     01/10/2017     Lab Results   Component Value Date     01/10/2017    BUN 23 01/10/2017    CREATININE 0.8 01/10/2017    TSH 1.852 12/21/2016    WBC 7.11 01/10/2017     No results found for: PHENYTOIN, PHENOBARB, VALPROATE, CBMZ  Urinalysis  No results for input(s): COLORU, CLARITYU, SPECGRAV, PHUR, PROTEINUA, GLUCOSEU, BILIRUBINCON, BLOODU, WBCU, RBCU, BACTERIA, MUCUS, NITRITE, LEUKOCYTESUR, UROBILINOGEN, HYALINECASTS in the last 24 hours.  No results for input(s): POCTGLUCOSE in the last 72 hours.    Medical  ROS  General ROS: negative for - chills, fatigue or fever  Respiratory ROS: no cough, shortness of breath, or wheezing  Cardiovascular ROS: no chest pain or dyspnea on exertion  Gastrointestinal ROS: no abdominal pain, change in bowel habits, or black or bloody stools  Musculoskeletal ROS: negative for - gait disturbance, joint stiffness, muscle pain or muscular weakness  Neurological ROS: negative for - confusion, dizziness, gait disturbance, headaches, impaired coordination/balance, seizures or visual changes    Mental Status Exam:  Appearance: unremarkable, age appropriate  Behavior/Cooperation:  normal, cooperative  Speech:  soft  Mood: steady  Affect:  congruent with mood and appropriate to situation/content Normal  Thought Process: illogical, perseverative  Thought Content: normal, no suicidality, no homicidality, delusions, or paranoia, hallucinations: (visual: yes)  Sensorium:   Impaired person  Alert and Oriented: x 1  Memory: Recent:  Impaired: able to report recent events   Remote:  Impaired; Named 4/4 past presidents   Attention/concentration: Unable to assess  Similarities: Unable to assess  Abstract reasoning:  Impaired  Insight:  Impaired  Judgment: Impaired      1/10/2017 8:33 AM  Radha Fernandes NP

## 2017-01-10 NOTE — NURSING
Left message for ELENO Jurado, regarding [facility] needs med list and facesheet attn: So @ [facility] [phone].

## 2017-01-19 NOTE — PT/OT/SLP DISCHARGE
Occupational Therapy Discharge Summary    Melina Boss  MRN: [id number]   Compression of lumbar vertebra   Patient Discharged from acute Occupational Therapy on 1/10/2017.  Please refer to prior OT note dated on 1/9/2017 for functional status.     Assessment:   Patient has not met goals.  GOALS:   Occupational Therapy Goals        Problem: Occupational Therapy Goal    Goal Priority Disciplines Outcome Interventions   Occupational Therapy Goal     OT, PT/OT Ongoing (interventions implemented as appropriate)    Description:  Goals to be met by: 1/20/17     Patient will increase functional independence with ADLs by performing:    Feeding with Minimal Assistance.  UE Dressing with Moderate Assistance.  LE Dressing with Maximum Assistance.  Grooming while seated with Moderate Assistance.  Supine to sit with Moderate Assistance.  Stand pivot transfers with Moderate Assistance.              Reasons for Discontinuation of Therapy Services  Transfer to alternate level of care.      Plan:  Patient Discharged to: [facility].  CRISTIANO Jacques  1/19/2017

## 2017-01-20 NOTE — PT/OT/SLP DISCHARGE
Physical Therapy Discharge Summary    Melina Boss  MRN: [id number]   Compression of lumbar vertebra   Patient Discharged from acute Physical Therapy on 17.  Please refer to prior PT noted date on  for functional status.     Assessment:   Patient was discharge unexpectedly.  Information required to complete and accurate discharge summary is unknown.  Refer to therapy initial evaluation and last progress note for initial and most recent functional status and goal achievement.  Recommendations made may be found in medical record.  GOALS:   Physical Therapy Goals        Problem: Physical Therapy Goal    Goal Priority Disciplines Outcome Goal Variances Interventions   Physical Therapy Goal     PT/OT, PT Ongoing (interventions implemented as appropriate)     Description:  Goals to be met by: 7 days ()    Patient will increase functional independence with mobility by performin. Supine to sit with MInimal Assistance= not met  2. Sit to supine with MInimal Assistance= not met  3. Sit to stand transfer with Minimal Assistance= not met  4. Bed to chair transfer with Moderate Assistance using Rolling Walker= not met  5. Gait  x 10 feet with Maximum Assistance using Rolling Walker. = not met  6. Stand for 2 minutes with Moderate Assistance using Rolling Walker= not met  7. Lower extremity exercise program x30 reps per handout, with supervision to improve overall muscle strength= not met               Reasons for Discontinuation of Therapy Services  Transfer to alternate level of care.      Plan:  Patient Discharged to: d/c to [facility], per chart.

## 2022-04-19 NOTE — ED NOTES
Kirsten Webb was seen and treated in our emergency department on 4/19/2022.  She may return to work on 04/30/2022.       If you have any questions or concerns, please don't hesitate to call.      See Card PA-C Report called to Gabriella Hernandez RN at [facility]